# Patient Record
Sex: MALE | Race: WHITE | Employment: OTHER | URBAN - METROPOLITAN AREA
[De-identification: names, ages, dates, MRNs, and addresses within clinical notes are randomized per-mention and may not be internally consistent; named-entity substitution may affect disease eponyms.]

---

## 2023-05-11 ENCOUNTER — APPOINTMENT (OUTPATIENT)
Dept: GENERAL RADIOLOGY | Age: 73
DRG: 477 | End: 2023-05-11
Payer: MEDICARE

## 2023-05-11 ENCOUNTER — HOSPITAL ENCOUNTER (INPATIENT)
Age: 73
LOS: 6 days | Discharge: ANOTHER HOSPITAL | DRG: 477 | End: 2023-05-17
Attending: EMERGENCY MEDICINE | Admitting: STUDENT IN AN ORGANIZED HEALTH CARE EDUCATION/TRAINING PROGRAM
Payer: MEDICARE

## 2023-05-11 DIAGNOSIS — I48.91 ATRIAL FIBRILLATION WITH RVR (HCC): Primary | ICD-10-CM

## 2023-05-11 PROBLEM — F41.9 ANXIETY: Status: ACTIVE | Noted: 2017-10-12

## 2023-05-11 PROBLEM — I10 ESSENTIAL HYPERTENSION: Status: ACTIVE | Noted: 2021-01-27

## 2023-05-11 PROBLEM — F41.9 ANXIETY: Chronic | Status: ACTIVE | Noted: 2017-10-12

## 2023-05-11 PROBLEM — J44.9 CHRONIC OBSTRUCTIVE PULMONARY DISEASE (HCC): Status: ACTIVE | Noted: 2022-03-08

## 2023-05-11 PROBLEM — E78.5 HYPERLIPIDEMIA: Status: ACTIVE | Noted: 2017-10-12

## 2023-05-11 PROBLEM — R00.2 PALPITATIONS: Status: ACTIVE | Noted: 2020-11-10

## 2023-05-11 PROBLEM — J44.9 CHRONIC OBSTRUCTIVE PULMONARY DISEASE (HCC): Chronic | Status: ACTIVE | Noted: 2022-03-08

## 2023-05-11 PROBLEM — E78.5 HYPERLIPIDEMIA: Chronic | Status: ACTIVE | Noted: 2017-10-12

## 2023-05-11 PROBLEM — I42.9 CARDIOMYOPATHY (HCC): Status: ACTIVE | Noted: 2020-12-07

## 2023-05-11 PROBLEM — I25.10 CORONARY ARTERY DISEASE INVOLVING NATIVE CORONARY ARTERY OF NATIVE HEART WITHOUT ANGINA PECTORIS: Chronic | Status: ACTIVE | Noted: 2021-03-05

## 2023-05-11 PROBLEM — I42.9 CARDIOMYOPATHY (HCC): Chronic | Status: ACTIVE | Noted: 2020-12-07

## 2023-05-11 PROBLEM — I25.10 CORONARY ARTERY DISEASE INVOLVING NATIVE CORONARY ARTERY OF NATIVE HEART WITHOUT ANGINA PECTORIS: Status: ACTIVE | Noted: 2021-03-05

## 2023-05-11 LAB
ABSOLUTE EOS #: 0.3 K/UL (ref 0–0.44)
ABSOLUTE IMMATURE GRANULOCYTE: 0.09 K/UL (ref 0–0.3)
ABSOLUTE LYMPH #: 4.16 K/UL (ref 1.1–3.7)
ABSOLUTE MONO #: 1.11 K/UL (ref 0.1–1.2)
ANION GAP SERPL CALCULATED.3IONS-SCNC: 17 MMOL/L (ref 9–17)
BASOPHILS # BLD: 0 % (ref 0–2)
BASOPHILS ABSOLUTE: 0.04 K/UL (ref 0–0.2)
BUN SERPL-MCNC: 21 MG/DL (ref 8–23)
BUN/CREAT BLD: 28 (ref 9–20)
CALCIUM SERPL-MCNC: 9.5 MG/DL (ref 8.6–10.4)
CHLORIDE SERPL-SCNC: 101 MMOL/L (ref 98–107)
CO2 SERPL-SCNC: 22 MMOL/L (ref 20–31)
CREAT SERPL-MCNC: 0.76 MG/DL (ref 0.7–1.2)
EOSINOPHILS RELATIVE PERCENT: 2 % (ref 1–4)
EST. AVERAGE GLUCOSE BLD GHB EST-MCNC: 131 MG/DL
GFR SERPL CREATININE-BSD FRML MDRD: >60 ML/MIN/1.73M2
GLUCOSE BLD-MCNC: 115 MG/DL (ref 75–110)
GLUCOSE BLD-MCNC: 124 MG/DL (ref 75–110)
GLUCOSE BLD-MCNC: 146 MG/DL (ref 75–110)
GLUCOSE SERPL-MCNC: 201 MG/DL (ref 70–99)
HBA1C MFR BLD: 6.2 % (ref 4–6)
HCT VFR BLD AUTO: 41.5 % (ref 40.7–50.3)
HGB BLD-MCNC: 14 G/DL (ref 13–17)
IMMATURE GRANULOCYTES: 1 %
LYMPHOCYTES # BLD: 26 % (ref 24–43)
MAGNESIUM SERPL-MCNC: 1.9 MG/DL (ref 1.6–2.6)
MCH RBC QN AUTO: 32.3 PG (ref 25.2–33.5)
MCHC RBC AUTO-ENTMCNC: 33.7 G/DL (ref 28.4–34.8)
MCV RBC AUTO: 95.6 FL (ref 82.6–102.9)
MONOCYTES # BLD: 7 % (ref 3–12)
MYOGLOBIN SERPL-MCNC: 322 NG/ML (ref 28–72)
NRBC AUTOMATED: 0 PER 100 WBC
PDW BLD-RTO: 13.9 % (ref 11.8–14.4)
PLATELET # BLD AUTO: 176 K/UL (ref 138–453)
PMV BLD AUTO: 9.9 FL (ref 8.1–13.5)
POTASSIUM SERPL-SCNC: 4.6 MMOL/L (ref 3.7–5.3)
RBC # BLD: 4.34 M/UL (ref 4.21–5.77)
SEG NEUTROPHILS: 64 % (ref 36–65)
SEGMENTED NEUTROPHILS ABSOLUTE COUNT: 10.26 K/UL (ref 1.5–8.1)
SODIUM SERPL-SCNC: 140 MMOL/L (ref 135–144)
TROPONIN I SERPL DL<=0.01 NG/ML-MCNC: 28 NG/L (ref 0–22)
WBC # BLD AUTO: 16 K/UL (ref 3.5–11.3)

## 2023-05-11 PROCEDURE — 85025 COMPLETE CBC W/AUTO DIFF WBC: CPT

## 2023-05-11 PROCEDURE — 99223 1ST HOSP IP/OBS HIGH 75: CPT | Performed by: NURSE PRACTITIONER

## 2023-05-11 PROCEDURE — 6370000000 HC RX 637 (ALT 250 FOR IP): Performed by: NURSE PRACTITIONER

## 2023-05-11 PROCEDURE — 96375 TX/PRO/DX INJ NEW DRUG ADDON: CPT

## 2023-05-11 PROCEDURE — 80048 BASIC METABOLIC PNL TOTAL CA: CPT

## 2023-05-11 PROCEDURE — 83735 ASSAY OF MAGNESIUM: CPT

## 2023-05-11 PROCEDURE — 84484 ASSAY OF TROPONIN QUANT: CPT

## 2023-05-11 PROCEDURE — 2500000003 HC RX 250 WO HCPCS: Performed by: EMERGENCY MEDICINE

## 2023-05-11 PROCEDURE — 82947 ASSAY GLUCOSE BLOOD QUANT: CPT

## 2023-05-11 PROCEDURE — 6360000002 HC RX W HCPCS: Performed by: EMERGENCY MEDICINE

## 2023-05-11 PROCEDURE — 96376 TX/PRO/DX INJ SAME DRUG ADON: CPT

## 2023-05-11 PROCEDURE — 73502 X-RAY EXAM HIP UNI 2-3 VIEWS: CPT

## 2023-05-11 PROCEDURE — 2000000000 HC ICU R&B

## 2023-05-11 PROCEDURE — 83874 ASSAY OF MYOGLOBIN: CPT

## 2023-05-11 PROCEDURE — 93005 ELECTROCARDIOGRAM TRACING: CPT | Performed by: EMERGENCY MEDICINE

## 2023-05-11 PROCEDURE — 83036 HEMOGLOBIN GLYCOSYLATED A1C: CPT

## 2023-05-11 PROCEDURE — 2580000003 HC RX 258: Performed by: EMERGENCY MEDICINE

## 2023-05-11 PROCEDURE — 96361 HYDRATE IV INFUSION ADD-ON: CPT

## 2023-05-11 PROCEDURE — 99285 EMERGENCY DEPT VISIT HI MDM: CPT

## 2023-05-11 PROCEDURE — 96374 THER/PROPH/DIAG INJ IV PUSH: CPT

## 2023-05-11 PROCEDURE — 6360000002 HC RX W HCPCS: Performed by: NURSE PRACTITIONER

## 2023-05-11 PROCEDURE — 71045 X-RAY EXAM CHEST 1 VIEW: CPT

## 2023-05-11 RX ORDER — ACETAMINOPHEN 650 MG/1
650 SUPPOSITORY RECTAL EVERY 6 HOURS PRN
Status: DISCONTINUED | OUTPATIENT
Start: 2023-05-11 | End: 2023-05-17 | Stop reason: HOSPADM

## 2023-05-11 RX ORDER — ATORVASTATIN CALCIUM 40 MG/1
40 TABLET, FILM COATED ORAL NIGHTLY
Status: DISCONTINUED | OUTPATIENT
Start: 2023-05-11 | End: 2023-05-17 | Stop reason: HOSPADM

## 2023-05-11 RX ORDER — SODIUM CHLORIDE 0.9 % (FLUSH) 0.9 %
5-40 SYRINGE (ML) INJECTION EVERY 12 HOURS SCHEDULED
Status: DISCONTINUED | OUTPATIENT
Start: 2023-05-11 | End: 2023-05-17 | Stop reason: HOSPADM

## 2023-05-11 RX ORDER — SODIUM CHLORIDE 0.9 % (FLUSH) 0.9 %
10 SYRINGE (ML) INJECTION PRN
Status: DISCONTINUED | OUTPATIENT
Start: 2023-05-11 | End: 2023-05-17 | Stop reason: HOSPADM

## 2023-05-11 RX ORDER — ATORVASTATIN CALCIUM 40 MG/1
TABLET, FILM COATED ORAL
Status: ON HOLD | COMMUNITY
Start: 2023-03-22 | End: 2023-05-19 | Stop reason: HOSPADM

## 2023-05-11 RX ORDER — DEXTROSE MONOHYDRATE 100 MG/ML
INJECTION, SOLUTION INTRAVENOUS CONTINUOUS PRN
Status: DISCONTINUED | OUTPATIENT
Start: 2023-05-11 | End: 2023-05-17 | Stop reason: HOSPADM

## 2023-05-11 RX ORDER — HYDROCODONE BITARTRATE AND ACETAMINOPHEN 5; 325 MG/1; MG/1
1 TABLET ORAL EVERY 6 HOURS PRN
Status: DISCONTINUED | OUTPATIENT
Start: 2023-05-11 | End: 2023-05-13

## 2023-05-11 RX ORDER — DILTIAZEM HYDROCHLORIDE 5 MG/ML
20 INJECTION INTRAVENOUS ONCE
Status: COMPLETED | OUTPATIENT
Start: 2023-05-11 | End: 2023-05-11

## 2023-05-11 RX ORDER — SPIRONOLACTONE 25 MG/1
25 TABLET ORAL DAILY
Status: DISCONTINUED | OUTPATIENT
Start: 2023-05-11 | End: 2023-05-12

## 2023-05-11 RX ORDER — INSULIN LISPRO 100 [IU]/ML
0-4 INJECTION, SOLUTION INTRAVENOUS; SUBCUTANEOUS NIGHTLY
Status: DISCONTINUED | OUTPATIENT
Start: 2023-05-11 | End: 2023-05-15

## 2023-05-11 RX ORDER — SODIUM CHLORIDE 9 MG/ML
INJECTION, SOLUTION INTRAVENOUS PRN
Status: DISCONTINUED | OUTPATIENT
Start: 2023-05-11 | End: 2023-05-17 | Stop reason: HOSPADM

## 2023-05-11 RX ORDER — CLOPIDOGREL BISULFATE 75 MG/1
75 TABLET ORAL DAILY
Status: ON HOLD | COMMUNITY
Start: 2023-03-29 | End: 2023-05-19 | Stop reason: HOSPADM

## 2023-05-11 RX ORDER — ONDANSETRON 2 MG/ML
4 INJECTION INTRAMUSCULAR; INTRAVENOUS EVERY 6 HOURS PRN
Status: DISCONTINUED | OUTPATIENT
Start: 2023-05-11 | End: 2023-05-13

## 2023-05-11 RX ORDER — ONDANSETRON 4 MG/1
4 TABLET, ORALLY DISINTEGRATING ORAL EVERY 8 HOURS PRN
Status: DISCONTINUED | OUTPATIENT
Start: 2023-05-11 | End: 2023-05-17 | Stop reason: HOSPADM

## 2023-05-11 RX ORDER — INSULIN LISPRO 100 [IU]/ML
0-4 INJECTION, SOLUTION INTRAVENOUS; SUBCUTANEOUS
Status: DISCONTINUED | OUTPATIENT
Start: 2023-05-11 | End: 2023-05-15

## 2023-05-11 RX ORDER — CEPHALEXIN 500 MG/1
500 CAPSULE ORAL EVERY 6 HOURS SCHEDULED
Status: COMPLETED | OUTPATIENT
Start: 2023-05-11 | End: 2023-05-13

## 2023-05-11 RX ORDER — CLOPIDOGREL BISULFATE 75 MG/1
75 TABLET ORAL DAILY
Status: DISCONTINUED | OUTPATIENT
Start: 2023-05-11 | End: 2023-05-17 | Stop reason: HOSPADM

## 2023-05-11 RX ORDER — MORPHINE SULFATE 2 MG/ML
2 INJECTION, SOLUTION INTRAMUSCULAR; INTRAVENOUS ONCE
Status: COMPLETED | OUTPATIENT
Start: 2023-05-11 | End: 2023-05-11

## 2023-05-11 RX ORDER — METOPROLOL SUCCINATE 100 MG/1
TABLET, EXTENDED RELEASE ORAL
Status: ON HOLD | COMMUNITY
Start: 2023-01-12 | End: 2023-05-19 | Stop reason: HOSPADM

## 2023-05-11 RX ORDER — POTASSIUM CHLORIDE 7.45 MG/ML
10 INJECTION INTRAVENOUS PRN
Status: DISCONTINUED | OUTPATIENT
Start: 2023-05-11 | End: 2023-05-17 | Stop reason: HOSPADM

## 2023-05-11 RX ORDER — M-VIT,TX,IRON,MINS/CALC/FOLIC 27MG-0.4MG
1 TABLET ORAL DAILY
Status: ON HOLD | COMMUNITY
End: 2023-05-19 | Stop reason: HOSPADM

## 2023-05-11 RX ORDER — SPIRONOLACTONE 25 MG/1
12.5 TABLET ORAL DAILY
Status: ON HOLD | COMMUNITY
Start: 2023-03-29 | End: 2023-05-19 | Stop reason: HOSPADM

## 2023-05-11 RX ORDER — HYDROCODONE BITARTRATE AND ACETAMINOPHEN 5; 325 MG/1; MG/1
2 TABLET ORAL EVERY 4 HOURS PRN
Status: DISCONTINUED | OUTPATIENT
Start: 2023-05-11 | End: 2023-05-13

## 2023-05-11 RX ORDER — DIGOXIN 0.25 MG/ML
250 INJECTION INTRAMUSCULAR; INTRAVENOUS ONCE
Status: COMPLETED | OUTPATIENT
Start: 2023-05-11 | End: 2023-05-11

## 2023-05-11 RX ORDER — MAGNESIUM SULFATE 1 G/100ML
1000 INJECTION INTRAVENOUS PRN
Status: DISCONTINUED | OUTPATIENT
Start: 2023-05-11 | End: 2023-05-17 | Stop reason: HOSPADM

## 2023-05-11 RX ORDER — METOPROLOL SUCCINATE 50 MG/1
100 TABLET, EXTENDED RELEASE ORAL DAILY
Status: DISCONTINUED | OUTPATIENT
Start: 2023-05-11 | End: 2023-05-12

## 2023-05-11 RX ORDER — LEVALBUTEROL TARTRATE 45 UG/1
2 AEROSOL, METERED ORAL EVERY 6 HOURS PRN
Status: DISCONTINUED | OUTPATIENT
Start: 2023-05-11 | End: 2023-05-17 | Stop reason: HOSPADM

## 2023-05-11 RX ORDER — 0.9 % SODIUM CHLORIDE 0.9 %
500 INTRAVENOUS SOLUTION INTRAVENOUS ONCE
Status: COMPLETED | OUTPATIENT
Start: 2023-05-11 | End: 2023-05-11

## 2023-05-11 RX ORDER — POLYETHYLENE GLYCOL 3350 17 G/17G
17 POWDER, FOR SOLUTION ORAL DAILY PRN
Status: DISCONTINUED | OUTPATIENT
Start: 2023-05-11 | End: 2023-05-17 | Stop reason: HOSPADM

## 2023-05-11 RX ORDER — POTASSIUM CHLORIDE 20 MEQ/1
40 TABLET, EXTENDED RELEASE ORAL PRN
Status: DISCONTINUED | OUTPATIENT
Start: 2023-05-11 | End: 2023-05-17 | Stop reason: HOSPADM

## 2023-05-11 RX ORDER — DILTIAZEM HYDROCHLORIDE 5 MG/ML
10 INJECTION INTRAVENOUS ONCE
Status: COMPLETED | OUTPATIENT
Start: 2023-05-11 | End: 2023-05-11

## 2023-05-11 RX ORDER — MORPHINE SULFATE 4 MG/ML
4 INJECTION, SOLUTION INTRAMUSCULAR; INTRAVENOUS ONCE
Status: COMPLETED | OUTPATIENT
Start: 2023-05-11 | End: 2023-05-11

## 2023-05-11 RX ORDER — ACETAMINOPHEN 325 MG/1
650 TABLET ORAL EVERY 6 HOURS PRN
Status: DISCONTINUED | OUTPATIENT
Start: 2023-05-11 | End: 2023-05-17 | Stop reason: HOSPADM

## 2023-05-11 RX ORDER — LISINOPRIL 5 MG/1
TABLET ORAL
Status: ON HOLD | COMMUNITY
End: 2023-05-19 | Stop reason: HOSPADM

## 2023-05-11 RX ORDER — MORPHINE SULFATE 4 MG/ML
4 INJECTION, SOLUTION INTRAMUSCULAR; INTRAVENOUS ONCE
Status: DISCONTINUED | OUTPATIENT
Start: 2023-05-11 | End: 2023-05-11

## 2023-05-11 RX ORDER — SODIUM CHLORIDE 9 MG/ML
INJECTION, SOLUTION INTRAVENOUS CONTINUOUS
Status: DISCONTINUED | OUTPATIENT
Start: 2023-05-11 | End: 2023-05-12

## 2023-05-11 RX ADMIN — CLOPIDOGREL BISULFATE 75 MG: 75 TABLET ORAL at 16:06

## 2023-05-11 RX ADMIN — SODIUM CHLORIDE 5 MG/HR: 900 INJECTION, SOLUTION INTRAVENOUS at 13:22

## 2023-05-11 RX ADMIN — DILTIAZEM HYDROCHLORIDE 20 MG: 5 INJECTION INTRAVENOUS at 11:04

## 2023-05-11 RX ADMIN — APIXABAN 5 MG: 5 TABLET, FILM COATED ORAL at 16:06

## 2023-05-11 RX ADMIN — APIXABAN 5 MG: 5 TABLET, FILM COATED ORAL at 20:48

## 2023-05-11 RX ADMIN — MORPHINE SULFATE 4 MG: 4 INJECTION, SOLUTION INTRAMUSCULAR; INTRAVENOUS at 11:04

## 2023-05-11 RX ADMIN — METOPROLOL SUCCINATE 100 MG: 50 TABLET, EXTENDED RELEASE ORAL at 17:48

## 2023-05-11 RX ADMIN — MORPHINE SULFATE 2 MG: 2 INJECTION, SOLUTION INTRAMUSCULAR; INTRAVENOUS at 13:19

## 2023-05-11 RX ADMIN — SPIRONOLACTONE 25 MG: 25 TABLET ORAL at 17:48

## 2023-05-11 RX ADMIN — DIGOXIN 250 MCG: 0.25 INJECTION INTRAMUSCULAR; INTRAVENOUS at 15:07

## 2023-05-11 RX ADMIN — HYDROCODONE BITARTRATE AND ACETAMINOPHEN 2 TABLET: 5; 325 TABLET ORAL at 16:06

## 2023-05-11 RX ADMIN — ATORVASTATIN CALCIUM 40 MG: 40 TABLET, FILM COATED ORAL at 20:48

## 2023-05-11 RX ADMIN — SODIUM CHLORIDE 500 ML: 9 INJECTION, SOLUTION INTRAVENOUS at 12:16

## 2023-05-11 RX ADMIN — CEPHALEXIN 500 MG: 500 CAPSULE ORAL at 20:48

## 2023-05-11 RX ADMIN — DILTIAZEM HYDROCHLORIDE 10 MG: 5 INJECTION INTRAVENOUS at 12:14

## 2023-05-11 RX ADMIN — CEPHALEXIN 500 MG: 500 CAPSULE ORAL at 16:07

## 2023-05-11 ASSESSMENT — PAIN DESCRIPTION - ORIENTATION: ORIENTATION: LEFT

## 2023-05-11 ASSESSMENT — ENCOUNTER SYMPTOMS
COLOR CHANGE: 0
VOMITING: 0
COUGH: 0
CONSTIPATION: 0
EYE DISCHARGE: 0
DIARRHEA: 0
FACIAL SWELLING: 0
SHORTNESS OF BREATH: 0
ABDOMINAL PAIN: 0
EYE REDNESS: 0

## 2023-05-11 ASSESSMENT — PAIN SCALES - GENERAL
PAINLEVEL_OUTOF10: 2
PAINLEVEL_OUTOF10: 10
PAINLEVEL_OUTOF10: 10

## 2023-05-11 ASSESSMENT — PAIN DESCRIPTION - LOCATION: LOCATION: HIP

## 2023-05-11 NOTE — CARE COORDINATION
Case Management Assessment  Initial Evaluation    Date/Time of Evaluation: 5/11/2023 2:33 PM  Assessment Completed by: Loel Spurling, LSW    If patient is discharged prior to next notation, then this note serves as note for discharge by case management. Patient Name: Paige Mendez                   YOB: 1950  Diagnosis: Atrial fibrillation with RVR ([address]) [I48.91]                   Date / Time: 5/11/2023 10:32 AM    Patient Admission Status: Inpatient   Readmission Risk (Low < 19, Mod (19-27), High > 27): No data recorded  Current PCP: Cherisse Collet, MD  PCP verified by CM? Yes    Chart Reviewed: Yes      History Provided by: Patient  Patient Orientation: Alert and Oriented    Patient Cognition: Alert    Hospitalization in the last 30 days (Readmission):  No    If yes, Readmission Assessment in  Navigator will be completed. Advance Directives:      Code Status: Full Code   Patient's Primary Decision Maker is:        Discharge Planning:    Patient lives with: Spouse/Significant Other Type of Home: House  Primary Care Giver: Self  Patient Support Systems include: Spouse/Significant Other, Children   Current Financial resources: Medicare  Current community resources:    Current services prior to admission: Durable Medical Equipment            Current DME: Anjel Form            Type of Home Care services:  None    ADLS  Prior functional level: Independent in ADLs/IADLs  Current functional level: Independent in ADLs/IADLs    PT AM-PAC:   /24  OT AM-PAC:   /24    Family can provide assistance at DC: Yes  Would you like Case Management to discuss the discharge plan with any other family members/significant others, and if so, who?  No  Plans to Return to Present Housing: Yes  Other Identified Issues/Barriers to RETURNING to current housing: none  Potential Assistance needed at discharge: N/A            Potential DME:    Patient expects to discharge to: [address] [place] [address] for transportation at discharge:

## 2023-05-11 NOTE — ACP (ADVANCE CARE PLANNING)
Advance Care Planning     Advance Care Planning Activator (Inpatient)  Conversation Note      Date of ACP Conversation: 5/11/2023     Conversation Conducted with: Patient with Decision Making Capacity    ACP Activator: Migdalia Claudio, [facility]:     Current Designated Health Care Decision Maker:     Click here to complete [address] including section of the Healthcare Decision Maker Relationship (ie \"Primary\")  Today we documented Decision Maker(s) consistent with Legal Next of Kin hierarchy. Care Preferences    Ventilation: \"If you were in your present state of health and suddenly became very ill and were unable to breathe on your own, what would your preference be about the use of a ventilator (breathing machine) if it were available to you? \"      Would the patient desire the use of ventilator (breathing machine)?: no    \"If your health worsens and it becomes clear that your chance of recovery is unlikely, what would your preference be about the use of a ventilator (breathing machine) if it were available to you? \"     Would the patient desire the use of ventilator (breathing machine)?: No      Resuscitation  \"CPR works best to restart the heart when there is a sudden event, like a heart attack, in someone who is otherwise healthy. Unfortunately, CPR does not typically restart the heart for people who have serious health conditions or who are very sick. \"    \"In the event your heart stopped as a result of an underlying serious health condition, would you want attempts to be made to restart your heart (answer \"yes\" for attempt to resuscitate) or would you prefer a natural death (answer \"no\" for do not attempt to resuscitate)? \" no       [] Yes   [x] No   Educated Patient / Uriel Uribe regarding differences between Advance Directives and portable DNR orders.     Length of ACP Conversation in minutes:      Conversation Outcomes:  ACP discussion completed    Follow-up

## 2023-05-11 NOTE — ED PROVIDER NOTES
[facility] ED  EMERGENCY DEPARTMENT ENCOUNTER      Pt Name: Rere Gusman  MRN: [id number]  [address] [place] 1950  Date of evaluation: 5/11/2023  Provider: Catrachito Medrano MD    CHIEF COMPLAINT       Chief Complaint   Patient presents with    Hip Pain     Pt states he was at [facility] clinic to get a pain injection for his back when he fell, no LOC, and has pain in Lt hip 10/10, Pt on Blood thinners. Tachycardia         HISTORY OF PRESENT ILLNESS  (Location/Symptom, Timing/Onset, Context/Setting, Quality, Duration, Modifying Factors, Severity.)   Rere Gusman is a 74 y.o. male who presents to the emergency department for complaints of pain in his left hip. This is an ongoing issue and he was supposed to see his pain management doctor today. He did not make it to the appointment. His heart rate was fast and he \"went down. \"  He did not lose consciousness and did not injure his hip. He has a history of atrial fibrillation and is on a blood thinner. The hip pain is severe. He does not have palpitations. Nursing Notes were reviewed. ALLERGIES     Patient has no known allergies. CURRENT MEDICATIONS       Previous Medications    No medications on file       PAST MEDICAL HISTORY   No past medical history on file. SURGICAL HISTORY     No past surgical history on file. FAMILY HISTORY     No family history on file. No family status information on file. SOCIAL HISTORY          REVIEW OF SYSTEMS    (2-9 systems for level 4, 10 or more for level 5)     Review of Systems   Constitutional:  Negative for chills, fatigue and fever. HENT:  Negative for congestion, ear discharge and facial swelling. Eyes:  Negative for discharge and redness. Respiratory:  Negative for cough and shortness of breath. Cardiovascular:  Negative for chest pain. Gastrointestinal:  Negative for abdominal pain, constipation, diarrhea and vomiting. Genitourinary:  Negative for dysuria and hematuria.

## 2023-05-11 NOTE — PROGRESS NOTES
Pt received from ER per cart. Pt CO L hip pain 10/10, repositioned to assist in pain relief. Placed on continuous cardiac monitor and pulse ox. Oriented to room, instructed to remain in bed and use call button to ask for help if needed. Wife present.

## 2023-05-11 NOTE — ED NOTES
ED to inpatient nurses report     Chief Complaint   Patient presents with    Hip Pain     Pt states he was at [facility] clinic to get a pain injection for his back when he fell, no LOC, and has pain in Lt hip 10/10, Pt on Blood thinners. Tachycardia      Present to ED from home who presents to the emergency department for complaints of pain in his left hip. This is an ongoing issue and he was supposed to see his pain management doctor today. He did not make it to the appointment. His heart rate was fast and he \"went down. \"  He did not lose consciousness and did not injure his hip. He has a history of atrial fibrillation and is on a blood thinner. The hip pain is severe. He does not have palpitations.   LOC: alert and orientated to name, place, date  Vital signs   Vitals:    05/11/23 1045 05/11/23 1109 05/11/23 1115 05/11/23 1214   BP: (!) 112/96 111/84 93/66 107/69   Pulse: (!) 168 (!) 159 (!) 139    Resp: 26 21 23    Temp:       TempSrc:       SpO2: 97% 97% 96%    Weight:       Height:          Oxygen Baseline RA    Current needs required RA       LDAs:   Peripheral IV 05/11/23 Right Antecubital (Active)   Site Assessment Clean, dry & intact 05/11/23 1103   Dressing Status New dressing applied;Clean, dry & intact 05/11/23 1103     Mobility: Requires assistance * 1  Fall Risk:    Pending ED orders: None  Code Status: Full  Consults: IP CONSULT TO HOSPITALIST  [x]  Hospitalist  Completed  [x] yes [] no Who:   []  Medicine  Completed  [] yes [] No Who:   []  Cardiology  Completed  [] yes [] No Who:   []  GI   Completed  [] yes [] No Who:   []  Neurology  Completed  [] yes [] No Who:   []  Nephrology Completed  [] yes [] No Who:    []  Vascular  Completed  [] yes [] No Who:   []  Ortho  Completed  [] yes [] No Who:     []  Surgery  Completed  [] yes [] No Who:    []  Urology  Completed  [] yes [] No Who:    []  CT Surgery Completed  [] yes [] No Who:   []  Podiatry  Completed  [] yes [] No Who:    []  Other

## 2023-05-11 NOTE — H&P
[facility]  Office: [id number], DO, Leo Henry, DO, Royce Ortiz, DO, Doris Burks, DO, Elvira Nazario MD, Pam Holly MD, Moiz Norton MD, Nadine Whittington MD,  Casey Hart MD, Curt Perla MD, Josee Patel, DO, Ismael Morris MD,  Hope De Leon MD, Yareli Verde MD, Alee Graham, DO, Gustavus Najjar, MD, Shanice Richey MD, Rell Oneil DO, [place] Bonner MD, Gerald Kanner, MD, Becky Peralta MD, Adalgisa Guzman MD,  Cookie Brittle, DO, Katie Donis MD,  Carlene Mendez, CNP,  Emma Hurtado CNP, Janae Mendoza, CNP, Leisa Casarez, CNP,  Tay Foreman, [facility], Catherine Leavitt, CNP, Bobo Winchester, CNP, Hansel Angelucci, CNP, Katie Goodwin, CNP, Dulce Rothman, CNP, Radha Rodriguez PA-C, Angel Harrison, CNS, Eloy Santos, CNP, Mega Mejia, CNP         [place]    HISTORY AND PHYSICAL EXAMINATION            Date:   5/11/2023  Patient name:  Beto Gonzalez  Date of admission:  5/11/2023 10:32 AM  MRN:   [id number]  Account:  [de-identified]  YOB: 1950  PCP:    Dre Parsons MD  Room:   2027/2027-01  Code Status:    Full Code    Chief Complaint:     Chief Complaint   Patient presents with    Hip Pain     Pt states he was at [facility] clinic to get a pain injection for his back when he fell, no LOC, and has pain in Lt hip 10/10, Pt on Blood thinners. Tachycardia       History Obtained From:     patient, electronic medical record    History of Present Illness:     Beto Gonzalez is a 74 y.o. Non- / non  male who presents with Hip Pain (Pt states he was at [facility] clinic to get a pain injection for his back when he fell, no LOC, and has pain in Lt hip 10/10, Pt on Blood thinners. ) and Tachycardia   and is admitted to the hospital for the management of Atrial fibrillation with RVR ([address]).     The patient states he was heading into management today related to chronic left hip pain when he fell. He denies LOC or feeling lightheaded or dizzy prior to the fall. He states he just fell. He did not hit his head. He states he has chronic left hip pain with numbness and tingling that's worse since the fall and that he scraped up his elbows. He told the ER physician that his heart was fast and he went down. He denies chest pain or shortness of breath. Take his morning meds including Toprol- mg p.o. because he is whenever his injection pain management. Patient reports a history of a cardioversion at some point in the past.     He states he has been taking antibiotics 4 times a day related to UTI. On chart review he did have a urine culture that was positive for E. coli on 5/3 and was started on Keflex 500 mg 4 times a day for 10 days. Per the culture he would be more sensitive to Cipro but he does not currently have any urinary complaints. Chest x-ray today showed no acute cardiopulmonary process. X-rays of the left hip showed no acute osseous or soft tissue abnormality. White count 16.0  Glucose was 201    On arrival to the ER with heart rates in the 160s. During assessment it was ranged between 130 and 150. Patient is currently on Cardizem drip. I ordered a one-time dose of digoxin and asked nursing to reach out to cardiology for further recommendations. Past Medical History:     Past Medical History:   Diagnosis Date    A-fib [facility])     Anxiety 10/12/2017    Cardiomyopathy ([address]) 12/7/2020    Formatting of this note might be different from the original. Mixed ischemic and nonischemic (secondary to atrial fibrillation)  Last Assessment & Plan:  Formatting of this note might be different from the original. No symptoms of heart failure or volume overload.   Will get an echocardiogram in 3 months to ensure his EF has normalized which I would not be surprised as long as he maintains sinus rh    Chronic obstructive pulmonary disease ([address]) 3/8/2022    Coronary

## 2023-05-12 ENCOUNTER — APPOINTMENT (OUTPATIENT)
Dept: MRI IMAGING | Age: 73
DRG: 477 | End: 2023-05-12
Payer: MEDICARE

## 2023-05-12 PROBLEM — R77.8 ELEVATED TROPONIN LEVEL NOT DUE MYOCARDIAL INFARCTION: Status: ACTIVE | Noted: 2023-05-12

## 2023-05-12 PROBLEM — E43 SEVERE MALNUTRITION (HCC): Status: ACTIVE | Noted: 2023-05-12

## 2023-05-12 LAB
ABSOLUTE EOS #: 0.14 K/UL (ref 0–0.44)
ABSOLUTE IMMATURE GRANULOCYTE: 0.05 K/UL (ref 0–0.3)
ABSOLUTE LYMPH #: 2.08 K/UL (ref 1.1–3.7)
ABSOLUTE MONO #: 0.94 K/UL (ref 0.1–1.2)
ABSOLUTE RETIC #: 0.04 M/UL (ref 0.03–0.08)
ANION GAP SERPL CALCULATED.3IONS-SCNC: 10 MMOL/L (ref 9–17)
BASOPHILS # BLD: 0 % (ref 0–2)
BASOPHILS ABSOLUTE: <0.03 K/UL (ref 0–0.2)
BUN SERPL-MCNC: 17 MG/DL (ref 8–23)
BUN/CREAT BLD: 40 (ref 9–20)
CALCIUM SERPL-MCNC: 8.2 MG/DL (ref 8.6–10.4)
CHLORIDE SERPL-SCNC: 108 MMOL/L (ref 98–107)
CO2 SERPL-SCNC: 22 MMOL/L (ref 20–31)
CREAT SERPL-MCNC: 0.43 MG/DL (ref 0.7–1.2)
EKG ATRIAL RATE: 138 BPM
EKG Q-T INTERVAL: 320 MS
EKG QRS DURATION: 68 MS
EKG QTC CALCULATION (BAZETT): 507 MS
EKG R AXIS: 13 DEGREES
EKG T AXIS: 80 DEGREES
EKG VENTRICULAR RATE: 151 BPM
EOSINOPHILS RELATIVE PERCENT: 1 % (ref 1–4)
EST. AVERAGE GLUCOSE BLD GHB EST-MCNC: 128 MG/DL
FIBRINOGEN: 372 MG/DL (ref 179–518)
GFR SERPL CREATININE-BSD FRML MDRD: >60 ML/MIN/1.73M2
GLUCOSE BLD-MCNC: 118 MG/DL (ref 75–110)
GLUCOSE BLD-MCNC: 129 MG/DL (ref 75–110)
GLUCOSE BLD-MCNC: 204 MG/DL (ref 75–110)
GLUCOSE BLD-MCNC: 269 MG/DL (ref 75–110)
GLUCOSE SERPL-MCNC: 122 MG/DL (ref 70–99)
HAPTOGLOB SERPL-MCNC: 328 MG/DL (ref 30–200)
HBA1C MFR BLD: 6.1 % (ref 4–6)
HCT VFR BLD AUTO: 34.9 % (ref 40.7–50.3)
HCT VFR BLD AUTO: 35.8 % (ref 40.7–50.3)
HGB BLD-MCNC: 11.7 G/DL (ref 13–17)
HGB BLD-MCNC: 12.3 G/DL (ref 13–17)
IMMATURE GRANULOCYTES: 0 %
IMMATURE RETIC FRACT: 7.3 % (ref 2.7–18.3)
INR PPP: 1.6
LDLC SERPL-MCNC: 360 U/L (ref 135–225)
LYMPHOCYTES # BLD: 17 % (ref 24–43)
MCH RBC QN AUTO: 32.6 PG (ref 25.2–33.5)
MCH RBC QN AUTO: 32.9 PG (ref 25.2–33.5)
MCHC RBC AUTO-ENTMCNC: 33.5 G/DL (ref 28.4–34.8)
MCHC RBC AUTO-ENTMCNC: 34.4 G/DL (ref 28.4–34.8)
MCV RBC AUTO: 95.7 FL (ref 82.6–102.9)
MCV RBC AUTO: 97.2 FL (ref 82.6–102.9)
MONOCYTES # BLD: 8 % (ref 3–12)
NRBC AUTOMATED: 0 PER 100 WBC
NRBC AUTOMATED: 0 PER 100 WBC
PDW BLD-RTO: 13.9 % (ref 11.8–14.4)
PDW BLD-RTO: 14 % (ref 11.8–14.4)
PLATELET # BLD AUTO: 128 K/UL (ref 138–453)
PLATELET # BLD AUTO: 147 K/UL (ref 138–453)
PMV BLD AUTO: 10 FL (ref 8.1–13.5)
PMV BLD AUTO: 9.7 FL (ref 8.1–13.5)
POTASSIUM SERPL-SCNC: 3.7 MMOL/L (ref 3.7–5.3)
PROTHROMBIN TIME: 18.7 SEC (ref 11.5–14.2)
RBC # BLD: 3.59 M/UL (ref 4.21–5.77)
RBC # BLD: 3.74 M/UL (ref 4.21–5.77)
RETIC HEMOGLOBIN: 35.3 PG (ref 28.2–35.7)
RETICS/RBC NFR AUTO: 1 % (ref 0.5–1.9)
SEG NEUTROPHILS: 74 % (ref 36–65)
SEGMENTED NEUTROPHILS ABSOLUTE COUNT: 9.3 K/UL (ref 1.5–8.1)
SODIUM SERPL-SCNC: 140 MMOL/L (ref 135–144)
TSH SERPL-ACNC: 1.03 UIU/ML (ref 0.3–5)
WBC # BLD AUTO: 12.5 K/UL (ref 3.5–11.3)
WBC # BLD AUTO: 12.7 K/UL (ref 3.5–11.3)

## 2023-05-12 PROCEDURE — 97530 THERAPEUTIC ACTIVITIES: CPT

## 2023-05-12 PROCEDURE — 83615 LACTATE (LD) (LDH) ENZYME: CPT

## 2023-05-12 PROCEDURE — 85384 FIBRINOGEN ACTIVITY: CPT

## 2023-05-12 PROCEDURE — 85025 COMPLETE CBC W/AUTO DIFF WBC: CPT

## 2023-05-12 PROCEDURE — 93010 ELECTROCARDIOGRAM REPORT: CPT | Performed by: INTERNAL MEDICINE

## 2023-05-12 PROCEDURE — 6370000000 HC RX 637 (ALT 250 FOR IP): Performed by: INTERNAL MEDICINE

## 2023-05-12 PROCEDURE — 83010 ASSAY OF HAPTOGLOBIN QUANT: CPT

## 2023-05-12 PROCEDURE — 97167 OT EVAL HIGH COMPLEX 60 MIN: CPT

## 2023-05-12 PROCEDURE — 2500000003 HC RX 250 WO HCPCS: Performed by: NURSE PRACTITIONER

## 2023-05-12 PROCEDURE — 36415 COLL VENOUS BLD VENIPUNCTURE: CPT

## 2023-05-12 PROCEDURE — 94761 N-INVAS EAR/PLS OXIMETRY MLT: CPT

## 2023-05-12 PROCEDURE — 85027 COMPLETE CBC AUTOMATED: CPT

## 2023-05-12 PROCEDURE — 80048 BASIC METABOLIC PNL TOTAL CA: CPT

## 2023-05-12 PROCEDURE — 6370000000 HC RX 637 (ALT 250 FOR IP): Performed by: NURSE PRACTITIONER

## 2023-05-12 PROCEDURE — 83036 HEMOGLOBIN GLYCOSYLATED A1C: CPT

## 2023-05-12 PROCEDURE — 97535 SELF CARE MNGMENT TRAINING: CPT

## 2023-05-12 PROCEDURE — 85045 AUTOMATED RETICULOCYTE COUNT: CPT

## 2023-05-12 PROCEDURE — 97163 PT EVAL HIGH COMPLEX 45 MIN: CPT

## 2023-05-12 PROCEDURE — 82947 ASSAY GLUCOSE BLOOD QUANT: CPT

## 2023-05-12 PROCEDURE — 97116 GAIT TRAINING THERAPY: CPT

## 2023-05-12 PROCEDURE — 99232 SBSQ HOSP IP/OBS MODERATE 35: CPT | Performed by: INTERNAL MEDICINE

## 2023-05-12 PROCEDURE — 2580000003 HC RX 258: Performed by: NURSE PRACTITIONER

## 2023-05-12 PROCEDURE — 85610 PROTHROMBIN TIME: CPT

## 2023-05-12 PROCEDURE — 72148 MRI LUMBAR SPINE W/O DYE: CPT

## 2023-05-12 PROCEDURE — 84443 ASSAY THYROID STIM HORMONE: CPT

## 2023-05-12 PROCEDURE — 2060000000 HC ICU INTERMEDIATE R&B

## 2023-05-12 RX ORDER — DILTIAZEM HYDROCHLORIDE 120 MG/1
120 CAPSULE, COATED, EXTENDED RELEASE ORAL DAILY
Status: DISCONTINUED | OUTPATIENT
Start: 2023-05-12 | End: 2023-05-17 | Stop reason: HOSPADM

## 2023-05-12 RX ORDER — METOPROLOL SUCCINATE 50 MG/1
150 TABLET, EXTENDED RELEASE ORAL DAILY
Status: DISCONTINUED | OUTPATIENT
Start: 2023-05-13 | End: 2023-05-14

## 2023-05-12 RX ADMIN — CEPHALEXIN 500 MG: 500 CAPSULE ORAL at 23:45

## 2023-05-12 RX ADMIN — SODIUM CHLORIDE: 9 INJECTION, SOLUTION INTRAVENOUS at 09:23

## 2023-05-12 RX ADMIN — CLOPIDOGREL BISULFATE 75 MG: 75 TABLET ORAL at 08:32

## 2023-05-12 RX ADMIN — SPIRONOLACTONE 25 MG: 25 TABLET ORAL at 08:42

## 2023-05-12 RX ADMIN — HYDROCODONE BITARTRATE AND ACETAMINOPHEN 1 TABLET: 5; 325 TABLET ORAL at 21:43

## 2023-05-12 RX ADMIN — DILTIAZEM HYDROCHLORIDE 120 MG: 120 CAPSULE, COATED, EXTENDED RELEASE ORAL at 18:53

## 2023-05-12 RX ADMIN — INSULIN LISPRO 2 UNITS: 100 INJECTION, SOLUTION INTRAVENOUS; SUBCUTANEOUS at 18:40

## 2023-05-12 RX ADMIN — CEPHALEXIN 500 MG: 500 CAPSULE ORAL at 00:23

## 2023-05-12 RX ADMIN — SODIUM CHLORIDE 7.5 MG/HR: 900 INJECTION, SOLUTION INTRAVENOUS at 00:58

## 2023-05-12 RX ADMIN — CEPHALEXIN 500 MG: 500 CAPSULE ORAL at 18:40

## 2023-05-12 RX ADMIN — ATORVASTATIN CALCIUM 40 MG: 40 TABLET, FILM COATED ORAL at 21:43

## 2023-05-12 RX ADMIN — APIXABAN 5 MG: 5 TABLET, FILM COATED ORAL at 08:35

## 2023-05-12 RX ADMIN — SODIUM CHLORIDE, PRESERVATIVE FREE 10 ML: 5 INJECTION INTRAVENOUS at 21:44

## 2023-05-12 RX ADMIN — INSULIN LISPRO 1 UNITS: 100 INJECTION, SOLUTION INTRAVENOUS; SUBCUTANEOUS at 12:06

## 2023-05-12 RX ADMIN — APIXABAN 5 MG: 5 TABLET, FILM COATED ORAL at 21:43

## 2023-05-12 RX ADMIN — HYDROCODONE BITARTRATE AND ACETAMINOPHEN 1 TABLET: 5; 325 TABLET ORAL at 08:36

## 2023-05-12 RX ADMIN — CEPHALEXIN 500 MG: 500 CAPSULE ORAL at 06:08

## 2023-05-12 RX ADMIN — CEPHALEXIN 500 MG: 500 CAPSULE ORAL at 12:06

## 2023-05-12 RX ADMIN — HYDROCODONE BITARTRATE AND ACETAMINOPHEN 2 TABLET: 5; 325 TABLET ORAL at 14:36

## 2023-05-12 RX ADMIN — METOPROLOL SUCCINATE 100 MG: 50 TABLET, EXTENDED RELEASE ORAL at 08:32

## 2023-05-12 RX ADMIN — SODIUM CHLORIDE 5 MG/HR: 900 INJECTION, SOLUTION INTRAVENOUS at 16:45

## 2023-05-12 ASSESSMENT — PAIN DESCRIPTION - ONSET
ONSET: GRADUAL
ONSET: GRADUAL
ONSET: ON-GOING
ONSET: GRADUAL

## 2023-05-12 ASSESSMENT — PAIN DESCRIPTION - LOCATION
LOCATION: HIP;SACRUM
LOCATION: LEG

## 2023-05-12 ASSESSMENT — PAIN DESCRIPTION - FREQUENCY
FREQUENCY: INTERMITTENT
FREQUENCY: INTERMITTENT
FREQUENCY: CONTINUOUS
FREQUENCY: INTERMITTENT

## 2023-05-12 ASSESSMENT — PAIN SCALES - GENERAL
PAINLEVEL_OUTOF10: 9
PAINLEVEL_OUTOF10: 4
PAINLEVEL_OUTOF10: 3
PAINLEVEL_OUTOF10: 2

## 2023-05-12 ASSESSMENT — PAIN DESCRIPTION - PAIN TYPE
TYPE: ACUTE PAIN;CHRONIC PAIN
TYPE: ACUTE PAIN;CHRONIC PAIN

## 2023-05-12 ASSESSMENT — PAIN DESCRIPTION - DESCRIPTORS
DESCRIPTORS: ACHING
DESCRIPTORS: SORE;ACHING

## 2023-05-12 ASSESSMENT — PAIN DESCRIPTION - ORIENTATION
ORIENTATION: LEFT
ORIENTATION: LEFT
ORIENTATION: RIGHT;LEFT
ORIENTATION: LEFT

## 2023-05-12 ASSESSMENT — PAIN - FUNCTIONAL ASSESSMENT: PAIN_FUNCTIONAL_ASSESSMENT: PREVENTS OR INTERFERES SOME ACTIVE ACTIVITIES AND ADLS

## 2023-05-12 NOTE — PLAN OF CARE
Problem: Discharge Planning  Goal: Discharge to home or other facility with appropriate resources  5/12/2023 0952 by Enrique Darnell RN  Outcome: Progressing  Flowsheets (Taken 5/12/2023 0800)  Discharge to home or other facility with appropriate resources:   Identify barriers to discharge with patient and caregiver   Arrange for needed discharge resources and transportation as appropriate  5/12/2023 0452 by Louisa Chacon RN  Outcome: Progressing     Problem: Safety - Adult  Goal: Free from fall injury  5/12/2023 0952 by Enrique Darnell RN  Outcome: Progressing  Flowsheets (Taken 5/12/2023 0800)  Free From Fall Injury: Instruct family/caregiver on patient safety  5/12/2023 0452 by Louisa Chacon RN  Outcome: Progressing     Problem: Skin/Tissue Integrity  Goal: Absence of new skin breakdown  Description: 1. Monitor for areas of redness and/or skin breakdown  2. Assess vascular access sites hourly  3. Every 4-6 hours minimum:  Change oxygen saturation probe site  4. Every 4-6 hours:  If on nasal continuous positive airway pressure, respiratory therapy assess nares and determine need for appliance change or resting period.   5/12/2023 9985 by Enrique Darnell RN  Outcome: Progressing  5/12/2023 0452 by Louisa Chacon RN  Outcome: Progressing     Problem: ABCDS Injury Assessment  Goal: Absence of physical injury  5/12/2023 0952 by Enrique Darnell RN  Outcome: Progressing  Flowsheets (Taken 5/12/2023 0800)  Absence of Physical Injury: Implement safety measures based on patient assessment  5/12/2023 0452 by Louisa Chacon RN  Outcome: Progressing     Problem: Pain  Goal: Verbalizes/displays adequate comfort level or baseline comfort level  5/12/2023 0952 by Enrique Darnell RN  Outcome: Progressing  Flowsheets (Taken 5/12/2023 0800)  Verbalizes/displays adequate comfort level or baseline comfort level:   Encourage patient to monitor pain and request assistance   Assess pain using appropriate pain scale   Implement non-pharmacological

## 2023-05-12 NOTE — PROGRESS NOTES
Paged on-call cardiologist for [facility]. Linda Shukla returned the call. New orders for PO cardizem received, and to also shut off the cardizem gtt 1 hour after starting PO cardizem. Night shift made aware.

## 2023-05-12 NOTE — PROGRESS NOTES
Pt complaining of numbness and weakness in the LLE. When working with PT/OT, discovered he has a new left foot drop with numbness and decreased sensation. Dr. Marylin Bhatti notified and came to bedside to further assess. Lumbar spine MRI and neuro consult ordered. 1 Dr. Marleni Omalley notified of new consult.

## 2023-05-12 NOTE — PROGRESS NOTES
Occupational Therapy  Facility/Department: [facility]  Occupational Therapy Initial Assessment    Name: Trevor Schaeffer  : 1950  MRN: [id number]  Date of Service: 2023    RN reports patient is medically stable for therapy treatment this date. Chart reviewed prior to treatment and patient is agreeable for therapy. All lines intact and patient positioned comfortably at end of treatment. All patient needs addressed prior to ending therapy session. Pt is currently functional below baseline and recommend comprehensive and intensive skilled therapy by a multidisciplinary team. Would expect patient to be able to tolerate 3 hours of therapy per day and able to tolerate at least one hour up in chair. Please refer to AM-PAC score for current mobility/adl level. Discharge Recommendations:  Patient would benefit from continued therapy after discharge  OT Equipment Recommendations  Equipment Needed: Yes (CTA)  Mobility Devices: ADL Assistive Devices  ADL Assistive Devices: Transfer Tub Bench;Reacher;Long-handled Sponge (Pt owns a sock aid)       Per H&P: Trevor Schaeffer is a 74 y.o. Non- / non  male who presents with Hip Pain (Pt states he was at [facility] clinic to get a pain injection for his back when he fell, no LOC, and has pain in Lt hip 10/10, Pt on Blood thinners. ) and Tachycardia and is admitted to the hospital for the management of Atrial fibrillation with RVR ([address]). The patient states he was heading into management today related to chronic left hip pain when he fell. He denies LOC or feeling lightheaded or dizzy prior to the fall. He states he just fell. He did not hit his head. He states he has chronic left hip pain with numbness and tingling that's worse since the fall and that he scraped up his elbows. He told the ER physician that his heart was fast and he went down. He denies chest pain or shortness of breath. Take his morning meds including Toprol- mg p.o.

## 2023-05-12 NOTE — PROGRESS NOTES
Physical Therapy  Facility/Department: [facility]  Physical Therapy Initial Assessment    Name: Clifton Rodney  : 1950  MRN: [id number]  Date of Service: 2023    Discharge Recommendations:  Patient would benefit from continued therapy after discharge. Pt is currently functional below baseline and recommend comprehensive and intensive skilled therapy by a multidisciplinary team. Would expect patient to be able to tolerate 3 hours of therapy per day and able to tolerate at least one hour up in chair. Please refer to AM-PAC score for current mobility/adl level. HPI (per chart): The patient states he was heading into management today related to chronic left hip pain when he fell. He denies LOC or feeling lightheaded or dizzy prior to the fall. He states he just fell. He did not hit his head. He states he has chronic left hip pain with numbness and tingling that's worse since the fall and that he scraped up his elbows. He told the ER physician that his heart was fast and he went down. He denies chest pain or shortness of breath. Take his morning meds including Toprol- mg p.o. because he is whenever his injection pain management. Patient reports a history of a cardioversion at some point in the past.    Patient Diagnosis(es): The encounter diagnosis was Atrial fibrillation with RVR ([address]). Past Medical History:  has a past medical history of A-fib ([address]), Anxiety, Cardiomyopathy ([address]), Chronic obstructive pulmonary disease ([address]), Coronary artery disease involving native coronary artery of native heart without angina pectoris, and Hyperlipidemia. Past Surgical History:  has a past surgical history that includes Cardioversion; Colonoscopy; Cardiac catheterization; and Appendectomy. Assessment   Body Structures, Functions, Activity Limitations Requiring Skilled Therapeutic Intervention: Decreased functional mobility ; Decreased ADL status; Decreased ROM; Decreased strength;Decreased

## 2023-05-12 NOTE — PROGRESS NOTES
Comprehensive Nutrition Assessment    Type and Reason for Visit:  Initial    Nutrition Recommendations/Plan:   Provide MAYNOR, 2 gram, low fat/low cholesterol, high fiber. Provide supplements as ordered   Monitor labs as they become available   Monitor skin integrity/weights     Malnutrition Assessment:  Malnutrition Status:  Severe malnutrition (05/12/23 1441)    Context:  Chronic Illness     Findings of the 6 clinical characteristics of malnutrition:  Energy Intake:  75% or less estimated energy requirements for 1 month or longer  Weight Loss:  Greater than 10% over 6 months     Body Fat Loss:  Mild body fat loss Orbital   Muscle Mass Loss:  Mild muscle mass loss Hand (interosseous)  Fluid Accumulation:  No significant fluid accumulation     Strength:  Not Performed    Nutrition Assessment:    Patient is noted to be on a cardizem drip, had an uneventful night, is being seen for significant weight loss,  he is here for pain in left hip and A-fib, goal today is to decrease need for cardizem drip, on supplements BID provides 700 kcals and 26 grams of protein if both are 100% consumed,    Nutrition Related Findings:    severe malnutrition from weight loss, no edema noted, active sounds for bowels, on antibiotic for UTI. Wound Type: None       Current Nutrition Intake & Therapies:    Average Meal Intake: 26-50%  Average Supplements Intake: 26-50%  ADULT DIET; Regular; Low Fat/Low Chol/High Fiber/2 gm Na; No Added Salt (3-4 gm)  ADULT ORAL NUTRITION SUPPLEMENT; Breakfast, Dinner; Standard High Calorie/High Protein Oral Supplement    Anthropometric Measures:  Height: 5' 10\" (177.8 cm)  Ideal Body Weight (IBW): 166 lbs (75 kg)       Current Body Weight: 168 lb (76.2 kg), 101.2 % IBW. Weight Source: Bed Scale  Current BMI (kg/m2): 24.1        Weight Adjustment For: No Adjustment                 BMI Categories: Normal Weight (BMI 18.5-24. 9)    Estimated Daily Nutrient Needs:  Energy Requirements Based On: Kcal/kg  Weight

## 2023-05-12 NOTE — PROGRESS NOTES
Transitions of Care Pharmacy Service   Medication Review    The patient's list of current home medications has been reviewed. Source(s) of information: Patient/ Surescripts    Based on information provided by the above source(s), I have updated the patient's home med list as described below. Please review the ACTION REQUESTED section of this note below for any discrepancies on current hospital orders. I changed or updated the following medications on the patient's home medication list:  Removed none     Added none     Adjusted   Aldactone 25mg (no directions) to 1/2 tab (12.5mg) PO QD   Other Notes none         PROVIDER ACTION REQUESTED  Medications that need to be addressed by a physician/nurse practitioner:    Medication Action Requested   Aldactone     Please adjust to home dose as appropriate         Please feel free to call me with any questions about this encounter. Thank you.     Tj Harmon [facility]   Transitions of Care Pharmacy Service  Phone:  [phone]  Fax: [phone]      Electronically signed by Tj Harmon [facility] on 5/12/2023 at 3:21 PM           Medications Prior to Admission: atorvastatin (LIPITOR) 40 MG tablet, 1 tablet Orally Once a day  apixaban (ELIQUIS) 5 MG TABS tablet, Take 1 tablet by mouth 2 times daily  metoprolol succinate (TOPROL XL) 100 MG extended release tablet, 1 tablet Orally Once a day  Multiple Vitamins-Minerals (THERAPEUTIC MULTIVITAMIN-MINERALS) tablet, Take 1 tablet by mouth daily  lisinopril (PRINIVIL;ZESTRIL) 5 MG tablet, 1 tablet Orally Once a day  clopidogrel (PLAVIX) 75 MG tablet, Take 1 tablet by mouth daily  spironolactone (ALDACTONE) 25 MG tablet, Take 0.5 tablets by mouth daily

## 2023-05-12 NOTE — PROGRESS NOTES
[facility]  Office: 7900  1826, DO, Kelsie Mcclellandter, DO, Harinder Luna, DO, [facility] Blood, DO, Avril Benavides MD, Evaristo Palmer MD, Kwadwo Bach MD, Rocio Aguilera MD,  Dallas Nieves MD, Namita Castillo MD, Janina Jacobs, DO, Lenny Rendon MD,  Danielle Aragon MD, Gail Miles MD, Monik Obrien, DO, Elizabeth Braun MD, Alicia Curran MD, Ignacio Pepper, DO, Johanna Vogel MD, Olesya Selby MD, Fito Trinidad MD, Alexi Reddy MD,  Vidal Spear, DO, Raisa Tejeda MD,  Grupo Salinas, CNP,  Christian Tineo, CNP, Luis Eduardo Guevara, CNP, Armand Soliz, CNP,  Jenna Bermudez, [facility], Cory Barnes, CNP, Enrrique Florentino, CNP, Jamar Canas, CNP, Dunia Diaz, CNP, Bimal Buckley, CNP, Charles Solar PAAdelsoC, Amish Varela, [facility], Julian Smith, CNP, Ben Laws, [facility]    Progress Note    5/12/2023    4:07 PM    Name:   Daniela Kumar  MRN:     [id number]     [address]:      [de-identified]   Room:   2027/2027-01   Day:  1  Admit Date:  5/11/2023 10:32 AM    PCP:   Teresa Thayer MD  Code Status:  Full Code    Subjective:     Patient is feeling better today. He has no dizziness. HR I still elevated 113 but he doesn't have any symptoms. Scr is improved. Platelets are slightly low today 128, rhythm still irregular but rate controlled. Pt did have some foot drop and worsening weakness this morning. No loss of bowel bladder function. Did have some numbness in leg. Brief History: This is a 73-year-old male who presents to the hospital for evaluation after falling. He says prior to fall patient was feeling lightheaded and dizzy, denies losing consciousness or hitting his head. He was found to be in atrial fibrillation with rapid ventricular response. He was started on Cardizem drip and his home Toprol was restarted.  Echocardiogram showed: Cardiology evaluated patient and recommended: with pain management   Hyperglycemia : without history of diabetes. Monitor with ISS, check A1c .  Tentative plan for empagliflozin given CHF    Medical Decision Makin [facility], DO  2023  4:07 PM

## 2023-05-13 ENCOUNTER — APPOINTMENT (OUTPATIENT)
Dept: CT IMAGING | Age: 73
DRG: 477 | End: 2023-05-13
Payer: MEDICARE

## 2023-05-13 PROBLEM — M54.42 ACUTE LEFT-SIDED LOW BACK PAIN WITH LEFT-SIDED SCIATICA: Status: ACTIVE | Noted: 2023-05-13

## 2023-05-13 PROBLEM — R93.7 ABNORMAL MAGNETIC RESONANCE IMAGING OF LUMBAR SPINE: Status: ACTIVE | Noted: 2023-05-13

## 2023-05-13 PROBLEM — M21.372 LEFT FOOT DROP: Status: ACTIVE | Noted: 2023-05-13

## 2023-05-13 LAB
ABSOLUTE EOS #: 0.2 K/UL (ref 0–0.44)
ABSOLUTE IMMATURE GRANULOCYTE: 0.06 K/UL (ref 0–0.3)
ABSOLUTE LYMPH #: 1.93 K/UL (ref 1.1–3.7)
ABSOLUTE MONO #: 1.24 K/UL (ref 0.1–1.2)
ANION GAP SERPL CALCULATED.3IONS-SCNC: 11 MMOL/L (ref 9–17)
BASOPHILS # BLD: 0 % (ref 0–2)
BASOPHILS ABSOLUTE: 0.03 K/UL (ref 0–0.2)
BUN SERPL-MCNC: 18 MG/DL (ref 8–23)
BUN/CREAT BLD: 32 (ref 9–20)
CALCIUM SERPL-MCNC: 9 MG/DL (ref 8.6–10.4)
CHLORIDE SERPL-SCNC: 101 MMOL/L (ref 98–107)
CO2 SERPL-SCNC: 25 MMOL/L (ref 20–31)
CREAT SERPL-MCNC: 0.56 MG/DL (ref 0.7–1.2)
EOSINOPHILS RELATIVE PERCENT: 2 % (ref 1–4)
FREE KAPPA/LAMBDA RATIO: 1.05 (ref 0.26–1.65)
GFR SERPL CREATININE-BSD FRML MDRD: >60 ML/MIN/1.73M2
GLUCOSE BLD-MCNC: 135 MG/DL (ref 75–110)
GLUCOSE BLD-MCNC: 146 MG/DL (ref 75–110)
GLUCOSE BLD-MCNC: 161 MG/DL (ref 75–110)
GLUCOSE BLD-MCNC: 313 MG/DL (ref 75–110)
GLUCOSE SERPL-MCNC: 157 MG/DL (ref 70–99)
HCT VFR BLD AUTO: 36.9 % (ref 40.7–50.3)
HGB BLD-MCNC: 12.5 G/DL (ref 13–17)
IMMATURE GRANULOCYTES: 1 %
KAPPA LC FREE SER-MCNC: 1.57 MG/DL (ref 0.37–1.94)
LAMBDA LC FREE SERPL-MCNC: 1.49 MG/DL (ref 0.57–2.63)
LV EF: 55 %
LVEF MODALITY: NORMAL
LYMPHOCYTES # BLD: 15 % (ref 24–43)
MCH RBC QN AUTO: 32.7 PG (ref 25.2–33.5)
MCHC RBC AUTO-ENTMCNC: 33.9 G/DL (ref 28.4–34.8)
MCV RBC AUTO: 96.6 FL (ref 82.6–102.9)
MONOCYTES # BLD: 9 % (ref 3–12)
NRBC AUTOMATED: 0 PER 100 WBC
PDW BLD-RTO: 13.9 % (ref 11.8–14.4)
PLATELET # BLD AUTO: 153 K/UL (ref 138–453)
PMV BLD AUTO: 9.7 FL (ref 8.1–13.5)
POTASSIUM SERPL-SCNC: 4.3 MMOL/L (ref 3.7–5.3)
PROSTATE SPECIFIC ANTIGEN: 0.67 NG/ML
RBC # BLD: 3.82 M/UL (ref 4.21–5.77)
SEG NEUTROPHILS: 73 % (ref 36–65)
SEGMENTED NEUTROPHILS ABSOLUTE COUNT: 9.86 K/UL (ref 1.5–8.1)
SODIUM SERPL-SCNC: 137 MMOL/L (ref 135–144)
WBC # BLD AUTO: 13.3 K/UL (ref 3.5–11.3)

## 2023-05-13 PROCEDURE — 6370000000 HC RX 637 (ALT 250 FOR IP): Performed by: NURSE PRACTITIONER

## 2023-05-13 PROCEDURE — 36415 COLL VENOUS BLD VENIPUNCTURE: CPT

## 2023-05-13 PROCEDURE — 84165 PROTEIN E-PHORESIS SERUM: CPT

## 2023-05-13 PROCEDURE — 99223 1ST HOSP IP/OBS HIGH 75: CPT | Performed by: INTERNAL MEDICINE

## 2023-05-13 PROCEDURE — 2500000003 HC RX 250 WO HCPCS: Performed by: INTERNAL MEDICINE

## 2023-05-13 PROCEDURE — 85025 COMPLETE CBC W/AUTO DIFF WBC: CPT

## 2023-05-13 PROCEDURE — 83521 IG LIGHT CHAINS FREE EACH: CPT

## 2023-05-13 PROCEDURE — 71260 CT THORAX DX C+: CPT

## 2023-05-13 PROCEDURE — 84153 ASSAY OF PSA TOTAL: CPT

## 2023-05-13 PROCEDURE — 2580000003 HC RX 258: Performed by: INTERNAL MEDICINE

## 2023-05-13 PROCEDURE — 70450 CT HEAD/BRAIN W/O DYE: CPT

## 2023-05-13 PROCEDURE — 6370000000 HC RX 637 (ALT 250 FOR IP): Performed by: PSYCHIATRY & NEUROLOGY

## 2023-05-13 PROCEDURE — 99233 SBSQ HOSP IP/OBS HIGH 50: CPT | Performed by: INTERNAL MEDICINE

## 2023-05-13 PROCEDURE — 6360000002 HC RX W HCPCS: Performed by: INTERNAL MEDICINE

## 2023-05-13 PROCEDURE — 2580000003 HC RX 258: Performed by: NURSE PRACTITIONER

## 2023-05-13 PROCEDURE — 6370000000 HC RX 637 (ALT 250 FOR IP): Performed by: INTERNAL MEDICINE

## 2023-05-13 PROCEDURE — 82947 ASSAY GLUCOSE BLOOD QUANT: CPT

## 2023-05-13 PROCEDURE — 93306 TTE W/DOPPLER COMPLETE: CPT

## 2023-05-13 PROCEDURE — 99223 1ST HOSP IP/OBS HIGH 75: CPT | Performed by: PSYCHIATRY & NEUROLOGY

## 2023-05-13 PROCEDURE — 2060000000 HC ICU INTERMEDIATE R&B

## 2023-05-13 PROCEDURE — 80048 BASIC METABOLIC PNL TOTAL CA: CPT

## 2023-05-13 PROCEDURE — 82232 ASSAY OF BETA-2 PROTEIN: CPT

## 2023-05-13 PROCEDURE — 6370000000 HC RX 637 (ALT 250 FOR IP): Performed by: PHYSICIAN ASSISTANT

## 2023-05-13 PROCEDURE — 6360000004 HC RX CONTRAST MEDICATION: Performed by: INTERNAL MEDICINE

## 2023-05-13 PROCEDURE — 84155 ASSAY OF PROTEIN SERUM: CPT

## 2023-05-13 PROCEDURE — 86334 IMMUNOFIX E-PHORESIS SERUM: CPT

## 2023-05-13 RX ORDER — HYDROCODONE BITARTRATE AND ACETAMINOPHEN 5; 325 MG/1; MG/1
2 TABLET ORAL EVERY 6 HOURS PRN
Status: DISCONTINUED | OUTPATIENT
Start: 2023-05-13 | End: 2023-05-17 | Stop reason: HOSPADM

## 2023-05-13 RX ORDER — SOTALOL HYDROCHLORIDE 80 MG/1
80 TABLET ORAL 2 TIMES DAILY
Status: DISCONTINUED | OUTPATIENT
Start: 2023-05-13 | End: 2023-05-17 | Stop reason: HOSPADM

## 2023-05-13 RX ORDER — HYDROCODONE BITARTRATE AND ACETAMINOPHEN 5; 325 MG/1; MG/1
1 TABLET ORAL EVERY 4 HOURS PRN
Status: DISCONTINUED | OUTPATIENT
Start: 2023-05-13 | End: 2023-05-17 | Stop reason: HOSPADM

## 2023-05-13 RX ORDER — DEXAMETHASONE SODIUM PHOSPHATE 4 MG/ML
4 INJECTION, SOLUTION INTRA-ARTICULAR; INTRALESIONAL; INTRAMUSCULAR; INTRAVENOUS; SOFT TISSUE EVERY 6 HOURS
Status: DISCONTINUED | OUTPATIENT
Start: 2023-05-13 | End: 2023-05-15

## 2023-05-13 RX ORDER — LIDOCAINE 4 G/G
1 PATCH TOPICAL DAILY
Status: DISCONTINUED | OUTPATIENT
Start: 2023-05-13 | End: 2023-05-17 | Stop reason: HOSPADM

## 2023-05-13 RX ORDER — SODIUM CHLORIDE 0.9 % (FLUSH) 0.9 %
10 SYRINGE (ML) INJECTION PRN
Status: DISCONTINUED | OUTPATIENT
Start: 2023-05-13 | End: 2023-05-17 | Stop reason: HOSPADM

## 2023-05-13 RX ORDER — 0.9 % SODIUM CHLORIDE 0.9 %
80 INTRAVENOUS SOLUTION INTRAVENOUS ONCE
Status: COMPLETED | OUTPATIENT
Start: 2023-05-13 | End: 2023-05-13

## 2023-05-13 RX ORDER — GABAPENTIN 100 MG/1
200 CAPSULE ORAL 3 TIMES DAILY
Status: DISCONTINUED | OUTPATIENT
Start: 2023-05-13 | End: 2023-05-17

## 2023-05-13 RX ADMIN — DILTIAZEM HYDROCHLORIDE 120 MG: 120 CAPSULE, COATED, EXTENDED RELEASE ORAL at 09:57

## 2023-05-13 RX ADMIN — HYDROCODONE BITARTRATE AND ACETAMINOPHEN 1 TABLET: 5; 325 TABLET ORAL at 02:13

## 2023-05-13 RX ADMIN — Medication 12.5 MG: at 09:57

## 2023-05-13 RX ADMIN — HYDROCODONE BITARTRATE AND ACETAMINOPHEN 1 TABLET: 5; 325 TABLET ORAL at 15:40

## 2023-05-13 RX ADMIN — GABAPENTIN 200 MG: 100 CAPSULE ORAL at 15:40

## 2023-05-13 RX ADMIN — SODIUM CHLORIDE, PRESERVATIVE FREE 10 ML: 5 INJECTION INTRAVENOUS at 09:58

## 2023-05-13 RX ADMIN — METOPROLOL SUCCINATE 150 MG: 50 TABLET, EXTENDED RELEASE ORAL at 09:57

## 2023-05-13 RX ADMIN — SODIUM CHLORIDE 80 ML: 0.9 INJECTION, SOLUTION INTRAVENOUS at 13:46

## 2023-05-13 RX ADMIN — GABAPENTIN 200 MG: 100 CAPSULE ORAL at 09:57

## 2023-05-13 RX ADMIN — DEXAMETHASONE SODIUM PHOSPHATE 4 MG: 4 INJECTION, SOLUTION INTRAMUSCULAR; INTRAVENOUS at 15:35

## 2023-05-13 RX ADMIN — INSULIN LISPRO 4 UNITS: 100 INJECTION, SOLUTION INTRAVENOUS; SUBCUTANEOUS at 20:53

## 2023-05-13 RX ADMIN — SODIUM CHLORIDE, PRESERVATIVE FREE 10 ML: 5 INJECTION INTRAVENOUS at 21:14

## 2023-05-13 RX ADMIN — ATORVASTATIN CALCIUM 40 MG: 40 TABLET, FILM COATED ORAL at 20:52

## 2023-05-13 RX ADMIN — BARIUM SULFATE 450 ML: 20 SUSPENSION ORAL at 13:48

## 2023-05-13 RX ADMIN — DEXAMETHASONE SODIUM PHOSPHATE 4 MG: 4 INJECTION, SOLUTION INTRAMUSCULAR; INTRAVENOUS at 09:58

## 2023-05-13 RX ADMIN — DEXAMETHASONE SODIUM PHOSPHATE 4 MG: 4 INJECTION, SOLUTION INTRAMUSCULAR; INTRAVENOUS at 20:52

## 2023-05-13 RX ADMIN — Medication 12.5 MG: at 02:15

## 2023-05-13 RX ADMIN — CEPHALEXIN 500 MG: 500 CAPSULE ORAL at 05:33

## 2023-05-13 RX ADMIN — SODIUM CHLORIDE, PRESERVATIVE FREE 10 ML: 5 INJECTION INTRAVENOUS at 13:47

## 2023-05-13 RX ADMIN — HYDROCODONE BITARTRATE AND ACETAMINOPHEN 2 TABLET: 5; 325 TABLET ORAL at 21:14

## 2023-05-13 RX ADMIN — GABAPENTIN 200 MG: 100 CAPSULE ORAL at 20:52

## 2023-05-13 RX ADMIN — IOPAMIDOL 75 ML: 755 INJECTION, SOLUTION INTRAVENOUS at 13:46

## 2023-05-13 ASSESSMENT — PAIN SCALES - GENERAL
PAINLEVEL_OUTOF10: 7
PAINLEVEL_OUTOF10: 7
PAINLEVEL_OUTOF10: 3

## 2023-05-13 ASSESSMENT — PAIN DESCRIPTION - DESCRIPTORS
DESCRIPTORS: ACHING

## 2023-05-13 ASSESSMENT — PAIN DESCRIPTION - ORIENTATION
ORIENTATION: MID
ORIENTATION: MID
ORIENTATION: RIGHT;LEFT

## 2023-05-13 ASSESSMENT — PAIN DESCRIPTION - LOCATION
LOCATION: BACK;HIP
LOCATION: HIP
LOCATION: BACK

## 2023-05-13 ASSESSMENT — PAIN - FUNCTIONAL ASSESSMENT: PAIN_FUNCTIONAL_ASSESSMENT: ACTIVITIES ARE NOT PREVENTED

## 2023-05-13 NOTE — CONSULTS
[facility]   IN-PATIENT SERVICE      NEUROLOGY CONSULT  NOTE            Date:   5/13/2023  Patient name:  Bi Rodriguez  Date of admission:  5/11/2023  YOB: 1950      Chief Complaint:     Chief Complaint   Patient presents with    Hip Pain     Pt states he was at [facility] clinic to get a pain injection for his back when he fell, no LOC, and has pain in Lt hip 10/10, Pt on Blood thinners. Tachycardia       Reason for Consult:      Left foot drop    History of Present Illness: The patient is a 74 y.o. male who presents with Hip Pain (Pt states he was at [facility] clinic to get a pain injection for his back when he fell, no LOC, and has pain in Lt hip 10/10, Pt on Blood thinners. ) and Tachycardia  . The patient was seen and examined and the chart was reviewed. Patient is right handed. This patient is able to provide information on his own behalf. Patient's chart has also been reviewed. Case has also been reviewed with attending physician. Patient has had about 2 months of left hip pain which appears to be severe. He had been receiving left hip injections but then developed significant back pain and was about to receive back injections at [facility]. Patient apparently fell and was noted to have a left foot drop. Patient was admitted to the hospital on 5/11/2023 for tachycardia. He has known atrial fibrillation and is on Eliquis 5 mg p.o. twice daily, Plavix 75 mg p.o. daily. He also receives Lipitor 40 mg p.o. daily, lisinopril 5 mg daily Toprol  mg daily Aldactone 25 mg 0.5 tablets daily and a multivitamin. Patient also been receiving antibiotic therapy for urinary tract infection. Patient had foul-smelling urine and evidence of possible urinary obstruction. Patient has been evaluated for possible prostate cancer. PSAs appear to be in the range of 0.4.   No definite diagnosis of prostate cancer seems to be listed on the chart although that has been a

## 2023-05-13 NOTE — FLOWSHEET NOTE
05/13/23 0130   Assessment   Charting Type Reassessment   Neurological   Neuro (WDL) X   Level of Consciousness 0   Orientation Level Oriented to person;Disoriented to place; Disoriented to time;Oriented to situation   Norma Coma Scale   Eye Opening 4   Best Verbal Response 4   Best Motor Response 6   [place] Coma Scale Score 14   Rhythm Interpretation   Pulse (!) 117     0130  Patient was found sitting in the chair next to the restroom. He stated he was tied up with wires, feeling hot, and thought it was 0600 at an apartment. The PCT had gotten him back to bed and he was re-oriented to his surroundings.

## 2023-05-13 NOTE — PLAN OF CARE
Discussed MRI findings with  at [address] [place] [address], at this time thinks patient will be a better candidate for radiation.  He did recommend checking MRI cervical spine, thoracic, and lumbar with and without contrast. He will review imaging and give further recommendations after

## 2023-05-13 NOTE — PLAN OF CARE
Problem: Discharge Planning  Goal: Discharge to home or other facility with appropriate resources  Recent Flowsheet Documentation  Taken 5/13/2023 [id number] by Dima Levine RN  Discharge to home or other facility with appropriate resources:   Identify barriers to discharge with patient and caregiver   Arrange for needed discharge resources and transportation as appropriate  Problem: Safety - Adult  Goal: Free from fall injury  Outcome: Progressing  Note: Continue fall precautions including arm band identification, falling star placed outside of the room and alarm at night and when unattended. Use of ambulatory aids when indicated and frequent visual checks. Frequent nursing rounds. Monitored orientation status. Bed remains in lowest locked position. Instructed to call for assistance prior to getting OOB. Fall sign posted outside of door. Problem: Skin/Tissue Integrity  Goal: Absence of new skin breakdown  Description: 1. Monitor for areas of redness and/or skin breakdown  2. Assess vascular access sites hourly  3. Every 4-6 hours minimum:  Change oxygen saturation probe site  4. Every 4-6 hours:  If on nasal continuous positive airway pressure, respiratory therapy assess nares and determine need for appliance change or resting period. Outcome: Progressing  Note: Skin assessment performed and charted. Assessed for areas of redness and or breakdown. Patient is in considerable discomfort even after comfort measures initiated and is not receptive at this time to frequent position changes. Encouraged oral intake. Monitored nutritional intake from meal trays. Assessed oral cavity. Assessed for areas of redness, open sores, thrush and dentition. Instructed on the importance of position changes for comfort. Problem: Pain  Goal: Verbalizes/displays adequate comfort level or baseline comfort level  Outcome: Progressing  Note: Comfort level assessed at frequent intervals. Medicated PRN.  Encouraged the use of

## 2023-05-13 NOTE — PROGRESS NOTES
[facility]  Office: 7900  1826, DO, Tommie Ba, DO, Radha Ro, DO, Tasha Stiles Blood, DO, Yoshi Morales MD, Severo Lopes, MD, Ondina Jones MD, Mireya Nava MD,  Cathy Larry MD, Fernando Collazo MD, Nat Overton DO, Aparna Saini MD,  Yissel Pope MD, Jame Hurst MD, Monse Chao DO, Tate Lundberg MD, Santiago Willis MD, Elvira Dominguez DO, Adilia Salazar MD, Austin De Oliveira MD, Thania Gallardo MD, Marjorie Hall MD,  Huy Griffin DO, Stephanie Vasquez MD,  Saeid Lee, CNP,  Herrera Laurent, CNP, Lizzie Ortiz, CNP, Shy Wilde, CNP,  Oscar Mejias, [facility], Ryan Benson, CNP, Justus Mccarty, CNP, Clifton Avila, CNP, Frank Regalado, CNP, Michael Mccord, CNP, Mohan Maynard PA-C, Eder Bowman, CNS, Yariel Meier, CNP, Fatou Lopez, [address] [facility] [address]    Progress Note    5/13/2023    9:31 AM    Name:   Lizeth Stephens  MRN:     [id number]     [address]:      [de-identified]   Room:   2027/2027-01  IP Day:  2  Admit Date:  5/11/2023 10:32 AM    PCP:   Christel Degroot MD  Code Status:  Full Code    Subjective: This morning, patient continues to have some lower extremity weakness and numbness. I did discuss results of the MRI with him. Today he says that he is actually had numbness in his left leg for 2 months and the foot drop is also been going on for 2 months. He says that his weakness is worse after the fall and currently is having difficulty ambulating. He says he had bowel movements and is urinating without any issues. He has a history of prostate cancer but cannot give me any information regarding treatment. His heart rate is better controlled today. He has no chest pain, shortness of breath, difficulty breathing. No nausea, vomiting, diarrhea. Heart rate is still irregular and he is in A-fib has no palpitations       Brief History:      This is a 47-year-old male who compression of the left L5 and S1 nerve roots  I did reach out to access center for neurosurgery consultation at [facility]  We will start steroids and gabapentin  Consult oncology. I did discuss case with neurology  Monitor for any signs of urinary retention with bladder scanning. Repeat PSA check multiple abnormal work-up  Dizziness and fall likely due to Atrial fibrillation with RVR  Heart rate is better controlled today. He was taken off of Cardizem drip and started on oral Cardizem. Toprol-XL was increased to 150 milligrams daily  Echocardiogram pending  UTI  Continue Keflex   History of LV dysfunction   Last EF in  was 55-60%  Thrombocytopenia   Resolved  History of CAD with EBER to LAD in 3/2021 has residual nonobstructive lesions in RCA and CX  Reviewed in care everywhere note from his cardiologist  Continue on Plavix, Lipitor   COPD without exacerbation   Continue current treatment  Dyslipidemia : statin  Chronic hip pain: follows with pain management   Hyperglycemia : without history of diabetes. Monitor with ISS, check A1c .  Tentative plan for empagliflozin given CHF    Medical Decision Makin [facility], DO  2023  9:31 AM

## 2023-05-13 NOTE — PROGRESS NOTES
Writer spoke with wife on the phone, wife expressed concerns over patient's weight loss and stated that patient \" has been very short with her\" when she calls. Reassurance offered, explained that at this point imaging is still teto completed and awaiting results.

## 2023-05-13 NOTE — PROGRESS NOTES
[address]  Occupational Therapy Not Seen    DATE: 2023    NAME: Yesica Elmore  MRN: [id number]   : 1950    Patient not seen this date for Occupational Therapy due to:      [x] Cancel by RN or physician due to: RN stated pt is in too much pain and is having testing today due to possible bone mets to spine. Will cont to follow and check with RN prior to seeing pt for next visit. [] Hemodialysis    [] Critical Lab Value Level     [] Blood transfusion in progress    [] Acute or unstable cardiovascular status   _MAP < 55 or more than >115  _HR < 40 or > 130    [] Acute or unstable pulmonary status   -FiO2 > 60%   _RR < 5 or >40    _O2 sats < 85%    [] Strict Bedrest    [] Off Unit for surgery or procedure    [] Off Unit for testing       [] Pending imaging to R/O fracture    [] Refusal by Patient      [] Other      [] OT being discontinued at this time. Patient independent. No further needs. [] OT being discontinued at this time as the patient has been transferred to hospice care. No further needs.       DALE Arredondo

## 2023-05-13 NOTE — PROGRESS NOTES
Writer present in room with Neurologist while patient examined. Patient is Alert and oriented x4, minimal forgetfulness when requested to repeat 3 items listed to him at the beginning of the neurological examination. Patient relaying a lot of pain in bilateral hips following fall that occurred prior to admission. Patient also c/o severe R. Heel pain. Writer placed pillow under lower leg to prevent heel from touching bed. Patient complains of lots of numbness in L . Leg. Patient able to follow all commands. CT scans have been ordered for patient.

## 2023-05-13 NOTE — CONSULTS
[address] Amandeep Yi, [facility]  [phone]               Cardiology Consult           Date of Admission:  5/11/2023  Date of Consultation:  5/13/2023      PCP:  Christel Degroot MD      Chief Complaint: Asked to see patient regarding atrial fibrillation    History of Present Illness:  Lizeth Stephens is a 74 y.o. male who presents with mechanical fall. Found to be in atrial fibrillation with RVR. History of: Persistent AFib +DC cardioversion 12/2020 . Was in sinus rhythm at least up to 3/31/23 when seen in office. LV systolic dysfunction  Mixed cardiomyopathy: Ischemic +tachycardia induced  Subsequent normalization of LV function post revascularization and sinus rhythm. Atherosclerotic CAD with EBER-lad 03/2021   Residual nonobstructive lesions: RCA +CX   Chads Vasc 4 on Eliquis   Essential HTN  HL P.   Prostate cancer  Has been having quite  a lot of low back pain with left foot numbness and foot drop for the past couple months. Held Eliquis 3 days and underwent back injection on Thursday. After injection, leaned to get wheelchair and fell, hurting hip. Brought to hospital and noted to be in afib. Heart rate at this point seems modestly controlled on toprol and Cardizem. Stable hemodynamics. Unfortunately noted to have possible malignant lesions on CT/MRI scan. Sacrum to be biopsied and Eliquis and Plavix held again. Denies chest pain, palpitations, SOB. No new neurologic symptoms. PMH:   has a past medical history of A-fib ([address]), Anxiety, Cardiomyopathy ([address]), Chronic obstructive pulmonary disease ([address]), Coronary artery disease involving native coronary artery of native heart without angina pectoris, and Hyperlipidemia. PSH:   has a past surgical history that includes Cardioversion; Colonoscopy; Cardiac catheterization; and Appendectomy.     Allergies:  No Known Allergies     Home Meds:    Prior to Admission medications    Medication Sig Start Date End anticoagulation for 30 days. REsuming Eliquis when procedures done. CAD. EBER to LAD > 1 year ago. No objection to holding Plavix for procedures, but resume when able. Would keep on just long term plavix with the Eluiquis (not ASA and Plavix)  Normalized LV function. Not particularly volume overloaded; would not add mor diuretic. Probable metastatic ca. Bx pending. Your other diagnoses.

## 2023-05-13 NOTE — PROGRESS NOTES
DATE: 2023    NAME: Rere Gusman  MRN: [id number]   : 1950    Patient not seen this date for Physical Therapy due to:      [x] Cancel by RN or physician due to:    Nurse states pt is in pain and  going for a CT this date for possible mets to spine. Will cont to follow. [] Hemodialysis    [] Critical Lab Value Level     [] Blood transfusion in progress    [] Acute or unstable cardiovascular status   _MAP < 55 or more than >115  _HR < 40 or > 130    [] Acute or unstable pulmonary status   -FiO2 > 60%   _RR < 5 or >40    _O2 sats < 85%    [] Strict Bedrest    [] Off Unit for surgery or procedure    [] Off Unit for testing       [] Pending imaging to R/O fracture    [] Refusal by Patient      [] Other      [] PT being discontinued at this time. Patient independent. No further needs. [] PT being discontinued at this time as the patient has been transferred to hospice care. No further needs.       Ana Johnson, PTA

## 2023-05-13 NOTE — FLOWSHEET NOTE
05/12/23 2230   Treatment Team Notification   Reason for Communication Evaluate; Review case   Team Member Name [facility] cardiology)   Treatment Team Role Physician Assistant   Method of Communication Call   Response Waiting for response   Notification Time 65     Carmeloernesto Diana 75yo male with an admitting dx of afib rvr. Currently still in afib. He received first dose PO cardizem 120mg at 1850 and cardizem gtt was turned off one hour later. Three hours later, his HR has occasionally spiked to 140s briefly and drop back down 110s. Patient is asymptomatic and at rest in bed. He is to start metoprolol 150mg tmw, previous home dose was 100mg. Ordered one time dose 12.5mg Lopressor PO to hold over until morning dose of lopressor. If sustaining a HR of 130, administer 6.25mg lopressor PO. Do not give with low BP. Page again if unable to administer this 5mg lopressor. 11-Mar-2022 21:41

## 2023-05-14 PROBLEM — C79.51 MALIGNANT NEOPLASM METASTATIC TO BONE (HCC): Status: ACTIVE | Noted: 2023-05-14

## 2023-05-14 PROBLEM — R29.898 WEAKNESS OF BOTH LOWER EXTREMITIES: Status: ACTIVE | Noted: 2023-05-14

## 2023-05-14 PROBLEM — M54.16 COMPRESSION OF LUMBAR NERVE ROOT: Status: ACTIVE | Noted: 2023-05-14

## 2023-05-14 PROBLEM — R73.9 HYPERGLYCEMIA: Status: ACTIVE | Noted: 2023-05-14

## 2023-05-14 PROBLEM — G54.8: Status: ACTIVE | Noted: 2023-05-14

## 2023-05-14 PROBLEM — C78.01 MALIGNANT NEOPLASM METASTATIC TO BOTH LUNGS (HCC): Status: ACTIVE | Noted: 2023-05-14

## 2023-05-14 PROBLEM — N30.00 ACUTE CYSTITIS WITHOUT HEMATURIA: Status: ACTIVE | Noted: 2023-05-14

## 2023-05-14 PROBLEM — C78.02 MALIGNANT NEOPLASM METASTATIC TO BOTH LUNGS (HCC): Status: ACTIVE | Noted: 2023-05-14

## 2023-05-14 LAB
GLUCOSE BLD-MCNC: 177 MG/DL (ref 75–110)
GLUCOSE BLD-MCNC: 183 MG/DL (ref 75–110)
GLUCOSE BLD-MCNC: 243 MG/DL (ref 75–110)
GLUCOSE BLD-MCNC: 278 MG/DL (ref 75–110)

## 2023-05-14 PROCEDURE — 93005 ELECTROCARDIOGRAM TRACING: CPT | Performed by: INTERNAL MEDICINE

## 2023-05-14 PROCEDURE — 2580000003 HC RX 258: Performed by: NURSE PRACTITIONER

## 2023-05-14 PROCEDURE — 2580000003 HC RX 258: Performed by: INTERNAL MEDICINE

## 2023-05-14 PROCEDURE — 6370000000 HC RX 637 (ALT 250 FOR IP): Performed by: NURSE PRACTITIONER

## 2023-05-14 PROCEDURE — 6370000000 HC RX 637 (ALT 250 FOR IP): Performed by: PSYCHIATRY & NEUROLOGY

## 2023-05-14 PROCEDURE — 2060000000 HC ICU INTERMEDIATE R&B

## 2023-05-14 PROCEDURE — 99232 SBSQ HOSP IP/OBS MODERATE 35: CPT | Performed by: PSYCHIATRY & NEUROLOGY

## 2023-05-14 PROCEDURE — 6370000000 HC RX 637 (ALT 250 FOR IP): Performed by: INTERNAL MEDICINE

## 2023-05-14 PROCEDURE — 2500000003 HC RX 250 WO HCPCS: Performed by: NURSE PRACTITIONER

## 2023-05-14 PROCEDURE — 82947 ASSAY GLUCOSE BLOOD QUANT: CPT

## 2023-05-14 PROCEDURE — 99232 SBSQ HOSP IP/OBS MODERATE 35: CPT | Performed by: INTERNAL MEDICINE

## 2023-05-14 PROCEDURE — 6360000002 HC RX W HCPCS: Performed by: INTERNAL MEDICINE

## 2023-05-14 RX ORDER — ENOXAPARIN SODIUM 100 MG/ML
1 INJECTION SUBCUTANEOUS 2 TIMES DAILY
Status: DISCONTINUED | OUTPATIENT
Start: 2023-05-14 | End: 2023-05-15

## 2023-05-14 RX ORDER — PANTOPRAZOLE SODIUM 40 MG/1
40 TABLET, DELAYED RELEASE ORAL
Status: DISCONTINUED | OUTPATIENT
Start: 2023-05-14 | End: 2023-05-17

## 2023-05-14 RX ORDER — DILTIAZEM HYDROCHLORIDE 5 MG/ML
5 INJECTION INTRAVENOUS ONCE
Status: COMPLETED | OUTPATIENT
Start: 2023-05-14 | End: 2023-05-14

## 2023-05-14 RX ORDER — METOPROLOL SUCCINATE 50 MG/1
150 TABLET, EXTENDED RELEASE ORAL DAILY
Status: DISCONTINUED | OUTPATIENT
Start: 2023-05-14 | End: 2023-05-17 | Stop reason: HOSPADM

## 2023-05-14 RX ADMIN — DEXAMETHASONE SODIUM PHOSPHATE 4 MG: 4 INJECTION, SOLUTION INTRAMUSCULAR; INTRAVENOUS at 09:37

## 2023-05-14 RX ADMIN — SODIUM CHLORIDE, PRESERVATIVE FREE 10 ML: 5 INJECTION INTRAVENOUS at 09:51

## 2023-05-14 RX ADMIN — HYDROCODONE BITARTRATE AND ACETAMINOPHEN 1 TABLET: 5; 325 TABLET ORAL at 03:21

## 2023-05-14 RX ADMIN — HYDROCODONE BITARTRATE AND ACETAMINOPHEN 1 TABLET: 5; 325 TABLET ORAL at 09:34

## 2023-05-14 RX ADMIN — GABAPENTIN 200 MG: 100 CAPSULE ORAL at 09:37

## 2023-05-14 RX ADMIN — SODIUM CHLORIDE, PRESERVATIVE FREE 10 ML: 5 INJECTION INTRAVENOUS at 15:59

## 2023-05-14 RX ADMIN — DILTIAZEM HYDROCHLORIDE 120 MG: 120 CAPSULE, COATED, EXTENDED RELEASE ORAL at 08:04

## 2023-05-14 RX ADMIN — GABAPENTIN 200 MG: 100 CAPSULE ORAL at 14:07

## 2023-05-14 RX ADMIN — INSULIN LISPRO 1 UNITS: 100 INJECTION, SOLUTION INTRAVENOUS; SUBCUTANEOUS at 17:18

## 2023-05-14 RX ADMIN — SOTALOL HYDROCHLORIDE 80 MG: 80 TABLET ORAL at 08:04

## 2023-05-14 RX ADMIN — ENOXAPARIN SODIUM 80 MG: 100 INJECTION SUBCUTANEOUS at 14:05

## 2023-05-14 RX ADMIN — PANTOPRAZOLE SODIUM 40 MG: 40 TABLET, DELAYED RELEASE ORAL at 14:07

## 2023-05-14 RX ADMIN — DEXAMETHASONE SODIUM PHOSPHATE 4 MG: 4 INJECTION, SOLUTION INTRAMUSCULAR; INTRAVENOUS at 03:21

## 2023-05-14 RX ADMIN — DEXAMETHASONE SODIUM PHOSPHATE 4 MG: 4 INJECTION, SOLUTION INTRAMUSCULAR; INTRAVENOUS at 16:00

## 2023-05-14 RX ADMIN — DILTIAZEM HYDROCHLORIDE 5 MG: 5 INJECTION INTRAVENOUS at 03:59

## 2023-05-14 RX ADMIN — SODIUM CHLORIDE, PRESERVATIVE FREE 10 ML: 5 INJECTION INTRAVENOUS at 22:04

## 2023-05-14 RX ADMIN — GABAPENTIN 200 MG: 100 CAPSULE ORAL at 21:49

## 2023-05-14 RX ADMIN — SOTALOL HYDROCHLORIDE 80 MG: 80 TABLET ORAL at 21:49

## 2023-05-14 RX ADMIN — Medication 12.5 MG: at 09:37

## 2023-05-14 RX ADMIN — DEXAMETHASONE SODIUM PHOSPHATE 4 MG: 4 INJECTION, SOLUTION INTRAMUSCULAR; INTRAVENOUS at 21:49

## 2023-05-14 RX ADMIN — METOPROLOL SUCCINATE 150 MG: 50 TABLET, EXTENDED RELEASE ORAL at 06:57

## 2023-05-14 RX ADMIN — ATORVASTATIN CALCIUM 40 MG: 40 TABLET, FILM COATED ORAL at 21:49

## 2023-05-14 ASSESSMENT — PAIN SCALES - GENERAL
PAINLEVEL_OUTOF10: 0
PAINLEVEL_OUTOF10: 2
PAINLEVEL_OUTOF10: 0
PAINLEVEL_OUTOF10: 0
PAINLEVEL_OUTOF10: 5
PAINLEVEL_OUTOF10: 0
PAINLEVEL_OUTOF10: 5

## 2023-05-14 ASSESSMENT — PAIN DESCRIPTION - ONSET: ONSET: ON-GOING

## 2023-05-14 ASSESSMENT — PAIN DESCRIPTION - LOCATION
LOCATION: FOOT;HIP
LOCATION: FOOT;HIP
LOCATION: HIP;FOOT

## 2023-05-14 ASSESSMENT — PAIN DESCRIPTION - DESCRIPTORS
DESCRIPTORS: ACHING
DESCRIPTORS: ACHING
DESCRIPTORS: ACHING;SHARP

## 2023-05-14 ASSESSMENT — PAIN - FUNCTIONAL ASSESSMENT: PAIN_FUNCTIONAL_ASSESSMENT: ACTIVITIES ARE NOT PREVENTED

## 2023-05-14 ASSESSMENT — PAIN DESCRIPTION - ORIENTATION
ORIENTATION: LEFT
ORIENTATION: LEFT

## 2023-05-14 ASSESSMENT — PAIN DESCRIPTION - FREQUENCY: FREQUENCY: CONTINUOUS

## 2023-05-14 ASSESSMENT — PAIN DESCRIPTION - PAIN TYPE: TYPE: CHRONIC PAIN;ACUTE PAIN

## 2023-05-14 NOTE — PROGRESS NOTES
[facility]  Office: 7900  1826, DO, Ally Quick, DO, Saniya Marino, DO, Dallas Garcia Blood, DO, Braulio Aleman MD, Federica Starr MD, Brock Benson MD, Suresh Strauss MD,  Neo Waters MD, Katie Auguste MD, Jade Burgos DO, Ezequiel Mayo MD,  Liz Crowder MD, Sherrill Cid MD, Nadine Ibarra, DO, Kiran Francisco MD, Mulu Zaidi MD, Ashlee Ramirez, DO, Quoc Childress MD, Yvrose Raza MD, Shannon Wallace MD, Ayesha Beckett MD,  Terri Rendon DO, Claudia Matson MD,  Monse Pool, CNP,  Paul Stein, CNP, Jame Gomez, CNP, Denice Cushing, CNP,  Syed Avila, DNP, Theresa Cortez, CNP, Lea Coleman, CNP, Shilpa Melo, CNP, Lizeth Linda, CNP, Muna Brown, CNP, Sherwin Escalera PA-C, Geo Tapia, CNS, Mariel Canseco, CNP, Camron Romero, [address] [facility]    Progress Note    5/14/2023    12:46 PM    Name:   Paige Mendez  MRN:     [id number]     [address]:      [de-identified]   Room:   [facility]/1007-02   Day:  3  Admit Date:  5/11/2023 10:32 AM    PCP:   Cherisse Collet, MD  Code Status:  Full Code    Subjective:       Patient still feeling weak today still have any loss of bowel or bladder function. He did convert to normal sinus rhythm yesterday. He does not have any palpitations nausea, vomiting, diarrhea. Brief History: This is a 75-year-old male who presents to the hospital for evaluation after falling. He says prior to fall patient was feeling lightheaded and dizzy, denies losing consciousness or hitting his head. He was found to be in atrial fibrillation with rapid ventricular response. He was started on Cardizem drip and his home Toprol was restarted. Echocardiogram showed: Cardiology evaluated patient and recommended:     Medications:      Allergies:  No Known Allergies    Current Meds:   Scheduled Meds:    metoprolol succinate  150 mg Oral Daily    dexamethasone  4 mg --    K 3.7 4.3  --    * 101  --    CO2 22 25  --    GLUCOSE 122* 157*  --    BUN 17 18  --    CREATININE 0.43* 0.56*  --    ANIONGAP 10 11  --    LABGLOM >60 >60  --    CALCIUM 8.2* 9.0  --    PSA  --   --  0.67       Recent Labs     05/12/23  0446 05/12/23  0807 05/12/23  0946 05/12/23  1157 05/13/23  0731 05/13/23  0928 05/13/23  1110 05/13/23  1614 05/13/23 2024 05/14/23  0742 05/14/23  1119   PROT  --   --   --   --   --  5.2*  --   --   --   --   --    LABA1C 6.1*  --   --   --   --   --   --   --   --   --   --    TSH 1.03  --   --   --   --   --   --   --   --   --   --    LDH  --   --  360*  --   --   --   --   --   --   --   --    POCGLU  --    < >  --    < > 135*  --  146* 161* 313* 177* 183*    < > = values in this interval not displayed. ABG:No results found for: POCPH, PHART, PH, POCPCO2, NQM8DMY, PCO2, POCPO2, PO2ART, PO2, POCHCO3, ROF5MRY, HCO3, NBEA, PBEA, BEART, BE, THGBART, THB, VLQ1GSN, SSUB0PYS, N0ADRUAL, O2SAT, FIO2  No results found for: SPECIAL  No results found for: CULTURE    Radiology:  XR HIP LEFT (2-3 VIEWS)    Result Date: 5/11/2023  No acute osseous or soft tissue abnormality. Degenerative change of the left SI joint and left hip joint. XR CHEST PORTABLE    Result Date: 5/11/2023  No acute cardiopulmonary process       Physical Examination:     General appearance:  alert, cooperative and no distress  Mental Status:  oriented to person, place and time and normal affect  Lungs:  clear to auscultation bilaterally, normal effort  Heart: Regular rate and regular rhythm, no murmur  Abdomen:  soft, nontender, nondistended, normal bowel sounds, no masses, hepatomegaly, splenomegaly  Extremities:  no edema, redness, tenderness in the calves 3 out of 5 muscle strength in lower extremities bilaterally with loss of sensation/numbness in left foot up to his knee. Proprioception is intact in feet. Muscle strength 5 out of 5 bilaterally in upper extremities.   Rest of cranial nerves are intact  Skin:  no gross lesions, rashes, induration    Assessment:     Hospital Problems             Last Modified POA    * (Principal) Atrial fibrillation with RVR (HCC) 5/11/2023 Yes    Chronic obstructive pulmonary disease (HCC) (Chronic) 5/11/2023 Yes    Coronary artery disease involving native coronary artery of native heart without angina pectoris (Chronic) 5/11/2023 Yes    Overview Signed 5/11/2023  4:44 PM by ANGELA Lock CNP     Formatting of this note might be different from the original.  LAD PCI in Mar 2021 & LCx & RCA were IFR'ed. Last Assessment & Plan:   Formatting of this note might be different from the original.  Was discharged on triple therapy after the PCI. What he told me he has been asked to stop his aspirin after April 4th. On statin therapy. Would recommend LDL below 70. Hyperlipidemia (Chronic) 5/11/2023 Yes    Overview Signed 5/11/2023  4:44 PM by ANGELA Lock CNP     Last Assessment & Plan:   Formatting of this note might be different from the original.  Has had hypertriglyceridemia on recent lipid panels, continue atorvastatin 10 mg daily and will follow-up with him on lab results.          Severe malnutrition ([address]) 5/12/2023 Yes    Elevated troponin level not due myocardial infarction 5/12/2023 Yes    Left foot drop 5/13/2023 Yes    Acute left-sided low back pain with left-sided sciatica 5/13/2023 Yes    Abnormal magnetic resonance imaging of lumbar spine 5/13/2023 Yes    Malignant neoplasm metastatic to bone ([address]) 5/14/2023 Yes    Malignant neoplasm metastatic to both lungs ([address]) 5/14/2023 Yes     Plan:     New metastatic carcinoma possibly of lung origin with spread to bone causing lower extremity weakness and foot drop  Initial MRI shows findings of bony metastasis along with paraspinal soft tissue extension of the tumor and severe foraminal narrowing at L5-S1 with compression of the left L5 and S1 nerve roots  CT chest abdomen pelvis showed a

## 2023-05-14 NOTE — PLAN OF CARE
Patient alert and oriented this shift. Reporting minimal pain. Patient converted back to SR after morning medications given. Plan is for MRI of spine and brain tomorrow and consult to IR for biopsy of lung and sacrum. Problem: Safety - Adult  Goal: Free from fall injury  5/14/2023 1702 by Ben Clemons RN  Outcome: Progressing  Flowsheets (Taken 5/12/2023 0800 by Kervin Rios RN)  Free From Fall Injury: Instruct family/caregiver on patient safety  5/14/2023 0447 by Truman Granda RN  Outcome: Progressing  5/14/2023 0443 by Truman Granda RN  Outcome: Progressing     Problem: Pain  Goal: Verbalizes/displays adequate comfort level or baseline comfort level  5/14/2023 1702 by Ben Clemons RN  Outcome: Progressing  Flowsheets (Taken 5/12/2023 0800 by Kervin Rios RN)  Verbalizes/displays adequate comfort level or baseline comfort level:   Encourage patient to monitor pain and request assistance   Assess pain using appropriate pain scale   Implement non-pharmacological measures as appropriate and evaluate response  Note: Patient stating very minimal pain today. 5/14/2023 0447 by Truman Granda RN  Outcome: Progressing  5/14/2023 0446 by Truman Granda RN  Outcome: Progressing     Problem: Neurosensory - Adult  Goal: Achieves stable or improved neurological status  Outcome: Progressing  Flowsheets (Taken 5/14/2023 1702)  Achieves stable or improved neurological status:   Assess for and report changes in neurological status   Maintain blood pressure and fluid volume within ordered parameters to optimize cerebral perfusion and minimize risk of hemorrhage   Monitor temperature, glucose, and sodium.  Initiate appropriate interventions as ordered     Problem: Cardiovascular - Adult  Goal: Maintains optimal cardiac output and hemodynamic stability  5/14/2023 1702 by Ben Clemons RN  Outcome: Progressing  Flowsheets (Taken 5/14/2023 1702)  Maintains optimal cardiac output and hemodynamic stability:   Monitor blood

## 2023-05-14 NOTE — PROGRESS NOTES
Pt 's-140's. /73. Remote Np notified. Order to give Toprol XL 150mg. Routine EKG also done. Results show : Critical Test Result: High HR 143BPM  Atrial fibrillation with rapid ventricular response  Low voltage QRS  Septal infarct , age undetermined  Abnormal ECG  Will continue to monitor.

## 2023-05-14 NOTE — PROGRESS NOTES
Physical Therapy  DATE: 2023    NAME: Yesica Elmore  MRN: [id number]   : 1950    Patient not seen this date for Physical Therapy due to:      [] Cancel by RN or physician due to:    [] Hemodialysis    [] Critical Lab Value Level     [] Blood transfusion in progress    [] Acute or unstable cardiovascular status   _MAP < 55 or more than >115  _HR < 40 or > 130    [] Acute or unstable pulmonary status   -FiO2 > 60%   _RR < 5 or >40    _O2 sats < 85%    [] Strict Bedrest    [] Off Unit for surgery or procedure    [] Off Unit for testing       [] Pending imaging to R/O fracture    [] Refusal by Patient      [x] Other: RN states pt is deferring therapy today d/t being afraid of going back into A-fib. Pt wants to wait until he gets the MRI.      [] PT being discontinued at this time. Patient independent. No further needs. [] PT being discontinued at this time as the patient has been transferred to hospice care. No further needs.       [employer], [place]

## 2023-05-14 NOTE — PLAN OF CARE
Pt transferred from CVICU. Pt in bed, remains free from falls. Prefers HOB elevated d/t back pain. Pt in afib. Eliquis on hold. HR in 120's-130's. Remote NP notified. 1x order for 5mg IV cardizem. HR in 90's-low 100's. Remote NP ok with it and order to continue to monitor. EKG to be done around 0600.    Problem: Cardiovascular - Adult  Goal: Maintains optimal cardiac output and hemodynamic stability  Outcome: Progressing     Problem: Pain  Goal: Verbalizes/displays adequate comfort level or baseline comfort level  5/14/2023 0447 by Maddi Irene RN  Outcome: Progressing     Problem: Safety - Adult  Goal: Free from fall injury  5/14/2023 0447 by Maddi Irene RN  Outcome: Progressing

## 2023-05-14 NOTE — PROGRESS NOTES
[facility] Neurology   IN-PATIENT SERVICE      NEUROLOGY PROGRESS  NOTE            Date:   5/14/2023  Patient name:  Yesica Elmore  Date of admission:  5/11/2023  YOB: 1950      Interval History:     Recent developments noted below from yesterday patient did have CT of the head without contrast.  No obvious mass eye disease or other lesions identified. To my eye patient does have follow-up with temporal atrophy present. Radiologist recommended MRI brain with and without contrast to further evaluate for possible Dastech disease. Has been ordered and is in process. Patient again confirms today that he is a cigar smoker. Patient is resting comfortably. He reports that since has been started on Decadron his leg has markedly improved. Marked reduction pain noted. Good improvement mobility of left leg noted. Numbness persist.    History of Present Illness: The patient is a 74 y.o. male who presents with Hip Pain (Pt states he was at [facility] clinic to get a pain injection for his back when he fell, no LOC, and has pain in Lt hip 10/10, Pt on Blood thinners. ) and Tachycardia  . The patient was seen and examined and the chart was reviewed. Brief history shows that patient has had increasing pain low back radiating to his right arm and left leg for several months. Is admitted to the hospital with left foot drop. MRI lumbosacral spine demonstrate evidence of distant disease of about L5. Abnormality in the apex of the right lung noted on chest x-ray. Patient was referred for CT chest abdomen pelvis. Findings are consistent with a spiculated mass right upper lobe, multiple lung nodules, mediastinal adenopathy. Concern for malignancy is raised. Oncology has been consulted. Neurosurgery was also consulted and is evaluating the case.     Past Medical History:     Past Medical History:   Diagnosis Date    A-fib [facility])     Anxiety 10/12/2017    Cardiomyopathy ([address]) 12/7/2020    Formatting

## 2023-05-14 NOTE — CONSULTS
Today's Date: 5/13/2023  Patient Name: Santa Cleaning  Date of admission: 5/11/2023 10:32 AM  Patient's age: 74 y. o., 1950  Admission Dx: Atrial fibrillation with RVR ([address]) [I48.91]    Reason for Consult: management recommendations  Requesting Physician: Vito Menendez MD    CHIEF COMPLAINT:  lytic bone lesion     History Obtained From:  patient    HISTORY OF PRESENT ILLNESS:      The patient is a 74 y.o.  male who is admitted to the hospital  after a fall. He was found to be in atrial fibrillation with rapid ventricular response. The patient has been complaining of worsening back pain and was seen by pain management for back and is receiving injections. Imaging study done during hospitalization showed lytic bone lesions and CT of the chest abdominal pelvis showed multiple lung nodules highly suggestive of metastatic carcinoma. The patient is seen and evaluated. He is still and denying and keeps telling me that he is feeling well and he does not have any cancer. Despite discussing the CT findings, he is still unable to comprehend the findings and kept assuring me that he is healthy and he has no problems. It seems that he has been complaining of worsening pain for many months. He was seen by chiropractor primary care physician physical therapy and then sent to pain management. He does not remember having any special imaging of his back or spine. Past Medical History:   has a past medical history of A-fib ([address]), Anxiety, Cardiomyopathy ([address]), Chronic obstructive pulmonary disease ([address]), Coronary artery disease involving native coronary artery of native heart without angina pectoris, and Hyperlipidemia. Past Surgical History:   has a past surgical history that includes Cardioversion; Colonoscopy; Cardiac catheterization; and Appendectomy. Medications:    Reviewed in Epic     Allergies:  Patient has no known allergies. Social History:   reports that he has never smoked.  He has

## 2023-05-14 NOTE — PROGRESS NOTES
Patient transferred to progressive care unit room 1007 per bed. Vitals and general assessment completed as charted. Patient oriented to room, call light, and bed controls. Bed alarm activated for patient's safety.

## 2023-05-14 NOTE — PROGRESS NOTES
Today's Date: 5/14/2023  Patient Name: Paige Mendez  Date of admission: 5/11/2023 10:32 AM  Patient's age: 74 y. o., 1950  Admission Dx: Atrial fibrillation with RVR ([address]) [I48.91]    Reason for Consult: management recommendations  Requesting Physician: Ezequiel Mayo MD    CHIEF COMPLAINT:  lytic bone lesion     INTERIM HISTORY  Patient is seen and examined  Wife at bedside. Pt is still denying all symptoms, but wife added that he was a smoker all his life, he just quit a month ago, she added that he lost about 40 lb. HISTORY OF PRESENT ILLNESS:      The patient is a 74 y.o.  male who is admitted to the hospital  after a fall. He was found to be in atrial fibrillation with rapid ventricular response. The patient has been complaining of worsening back pain and was seen by pain management for back and is receiving injections. Imaging study done during hospitalization showed lytic bone lesions and CT of the chest abdominal pelvis showed multiple lung nodules highly suggestive of metastatic carcinoma. The patient is seen and evaluated. He is still and denying and keeps telling me that he is feeling well and he does not have any cancer. Despite discussing the CT findings, he is still unable to comprehend the findings and kept assuring me that he is healthy and he has no problems. It seems that he has been complaining of worsening pain for many months. He was seen by chiropractor primary care physician physical therapy and then sent to pain management. He does not remember having any special imaging of his back or spine. Past Medical History:   has a past medical history of A-fib ([address]), Anxiety, Cardiomyopathy ([address]), Chronic obstructive pulmonary disease ([address]), Coronary artery disease involving native coronary artery of native heart without angina pectoris, and Hyperlipidemia.     Past Surgical History:   has a past surgical history that includes Cardioversion; Colonoscopy; Cardiac back pain with left-sided sciatica [M54.42] 05/13/2023    Abnormal magnetic resonance imaging of lumbar spine [R93.7] 05/13/2023    Severe malnutrition ([address]) [E43] 05/12/2023    Elevated troponin level not due myocardial infarction [R77.8] 05/12/2023    Atrial fibrillation with RVR ([address]) [I48.91] 05/11/2023    Chronic obstructive pulmonary disease ([address]) [J44.9] 03/08/2022    Coronary artery disease involving native coronary artery of native heart without angina pectoris [I25.10] 03/05/2021    Hyperlipidemia [E78.5] 10/12/2017         IMPRESSION:   Metastatic carcinoma, possibly of lung origin  Lung and bone metastases  Left foot drop  Atrial fibrillation with rapid ventricular response  Smoker     RECOMMENDATIONS:  The picture is highly suggestive of metastatic carcinoma possibly of lung origin  The patient is a lifelong smoker, raising the possibility of lung cancer. We will need tissue diagnosis so we will arrange for a biopsy , he is agreeable. Will order for tomorrow   Hold eliquis and he is now on lovenox   We will need brain imaging to exclude brain metastases, ordered   We will follow closely with you      Discussed with patient and Nurse. Thank you for asking us to see this patient.     AMANDA BETTENCOURT [facility] MD [place]  Hematologist/Medical Oncologist  Cell: [phone]

## 2023-05-15 ENCOUNTER — APPOINTMENT (OUTPATIENT)
Dept: MRI IMAGING | Age: 73
DRG: 477 | End: 2023-05-15
Payer: MEDICARE

## 2023-05-15 ENCOUNTER — APPOINTMENT (OUTPATIENT)
Dept: CT IMAGING | Age: 73
DRG: 477 | End: 2023-05-15
Payer: MEDICARE

## 2023-05-15 LAB
ALBUMIN PERCENT: 54 % (ref 45–65)
ALBUMIN SERPL-MCNC: 2.8 G/DL (ref 3.2–5.2)
ALPHA 1 PERCENT: 6 % (ref 3–6)
ALPHA 2 PERCENT: 19 % (ref 6–13)
ALPHA1 GLOB SERPL ELPH-MCNC: 0.3 G/DL (ref 0.1–0.4)
ALPHA2 GLOB SERPL ELPH-MCNC: 1 G/DL (ref 0.5–0.9)
ANTI-XA UNFRAC HEPARIN: 0.37 IU/L (ref 0.3–0.7)
B-GLOBULIN SERPL ELPH-MCNC: 0.6 G/DL (ref 0.5–1.1)
B2 MICROGLOB SERPL IA-MCNC: 1.5 MG/L (ref 0.6–2.4)
BETA PERCENT: 12 % (ref 11–19)
ERYTHROCYTE [DISTWIDTH] IN BLOOD BY AUTOMATED COUNT: 13.8 % (ref 11.8–14.4)
GAMMA GLOB SERPL ELPH-MCNC: 0.5 G/DL (ref 0.5–1.5)
GAMMA GLOBULIN %: 9 % (ref 9–20)
GLUCOSE BLD-MCNC: 162 MG/DL (ref 75–110)
GLUCOSE BLD-MCNC: 169 MG/DL (ref 75–110)
GLUCOSE BLD-MCNC: 183 MG/DL (ref 75–110)
GLUCOSE BLD-MCNC: 273 MG/DL (ref 75–110)
HCT VFR BLD AUTO: 36.1 % (ref 40.7–50.3)
HGB BLD-MCNC: 12.1 G/DL (ref 13–17)
INR PPP: 1.3
INTERPRETATION SERPL IFE-IMP: NORMAL
MCH RBC QN AUTO: 32.4 PG (ref 25.2–33.5)
MCHC RBC AUTO-ENTMCNC: 33.5 G/DL (ref 28.4–34.8)
MCV RBC AUTO: 96.5 FL (ref 82.6–102.9)
NRBC AUTOMATED: 0 PER 100 WBC
PARTIAL THROMBOPLASTIN TIME: 24.4 SEC (ref 23.9–33.8)
PATHOLOGIST: ABNORMAL
PATHOLOGIST: NORMAL
PLATELET # BLD AUTO: 205 K/UL (ref 138–453)
PMV BLD AUTO: 9.8 FL (ref 8.1–13.5)
PROT PATTERN SERPL ELPH-IMP: ABNORMAL
PROT SERPL-MCNC: 5.2 G/DL (ref 6.4–8.3)
PROTHROMBIN TIME: 16.2 SEC (ref 11.5–14.2)
RBC # BLD AUTO: 3.74 M/UL (ref 4.21–5.77)
SURGICAL PATHOLOGY REPORT: NORMAL
TOTAL PROT. SUM,%: 100 % (ref 98–102)
TOTAL PROT. SUM: 5.2 G/DL (ref 6.3–8.2)
WBC OTHER # BLD: 17.8 K/UL (ref 3.5–11.3)

## 2023-05-15 PROCEDURE — 72156 MRI NECK SPINE W/O & W/DYE: CPT

## 2023-05-15 PROCEDURE — 6370000000 HC RX 637 (ALT 250 FOR IP): Performed by: NURSE PRACTITIONER

## 2023-05-15 PROCEDURE — 2580000003 HC RX 258: Performed by: NURSE PRACTITIONER

## 2023-05-15 PROCEDURE — 85730 THROMBOPLASTIN TIME PARTIAL: CPT

## 2023-05-15 PROCEDURE — 97116 GAIT TRAINING THERAPY: CPT

## 2023-05-15 PROCEDURE — 88334 PATH CONSLTJ SURG CYTO XM EA: CPT

## 2023-05-15 PROCEDURE — 82947 ASSAY GLUCOSE BLOOD QUANT: CPT

## 2023-05-15 PROCEDURE — 97530 THERAPEUTIC ACTIVITIES: CPT

## 2023-05-15 PROCEDURE — 88342 IMHCHEM/IMCYTCHM 1ST ANTB: CPT

## 2023-05-15 PROCEDURE — 93005 ELECTROCARDIOGRAM TRACING: CPT | Performed by: INTERNAL MEDICINE

## 2023-05-15 PROCEDURE — 97535 SELF CARE MNGMENT TRAINING: CPT

## 2023-05-15 PROCEDURE — 99232 SBSQ HOSP IP/OBS MODERATE 35: CPT | Performed by: INTERNAL MEDICINE

## 2023-05-15 PROCEDURE — 6370000000 HC RX 637 (ALT 250 FOR IP): Performed by: INTERNAL MEDICINE

## 2023-05-15 PROCEDURE — 85027 COMPLETE CBC AUTOMATED: CPT

## 2023-05-15 PROCEDURE — 72149 MRI LUMBAR SPINE W/DYE: CPT

## 2023-05-15 PROCEDURE — 72157 MRI CHEST SPINE W/O & W/DYE: CPT

## 2023-05-15 PROCEDURE — 36415 COLL VENOUS BLD VENIPUNCTURE: CPT

## 2023-05-15 PROCEDURE — 2709999900 CT BIOPSY DEEP BONE PERCUTANEOUS

## 2023-05-15 PROCEDURE — 6360000002 HC RX W HCPCS: Performed by: INTERNAL MEDICINE

## 2023-05-15 PROCEDURE — 70553 MRI BRAIN STEM W/O & W/DYE: CPT

## 2023-05-15 PROCEDURE — 85520 HEPARIN ASSAY: CPT

## 2023-05-15 PROCEDURE — 85610 PROTHROMBIN TIME: CPT

## 2023-05-15 PROCEDURE — 88305 TISSUE EXAM BY PATHOLOGIST: CPT

## 2023-05-15 PROCEDURE — 6360000002 HC RX W HCPCS: Performed by: RADIOLOGY

## 2023-05-15 PROCEDURE — 2060000000 HC ICU INTERMEDIATE R&B

## 2023-05-15 PROCEDURE — 0QB13ZX EXCISION OF SACRUM, PERCUTANEOUS APPROACH, DIAGNOSTIC: ICD-10-PCS | Performed by: RADIOLOGY

## 2023-05-15 PROCEDURE — 77012 CT SCAN FOR NEEDLE BIOPSY: CPT

## 2023-05-15 PROCEDURE — A9579 GAD-BASE MR CONTRAST NOS,1ML: HCPCS | Performed by: INTERNAL MEDICINE

## 2023-05-15 PROCEDURE — 99231 SBSQ HOSP IP/OBS SF/LOW 25: CPT | Performed by: PSYCHIATRY & NEUROLOGY

## 2023-05-15 PROCEDURE — 88333 PATH CONSLTJ SURG CYTO XM 1: CPT

## 2023-05-15 PROCEDURE — 88341 IMHCHEM/IMCYTCHM EA ADD ANTB: CPT

## 2023-05-15 PROCEDURE — 6360000004 HC RX CONTRAST MEDICATION: Performed by: INTERNAL MEDICINE

## 2023-05-15 RX ORDER — DEXAMETHASONE SODIUM PHOSPHATE 4 MG/ML
4 INJECTION, SOLUTION INTRA-ARTICULAR; INTRALESIONAL; INTRAMUSCULAR; INTRAVENOUS; SOFT TISSUE EVERY 12 HOURS
Status: DISCONTINUED | OUTPATIENT
Start: 2023-05-15 | End: 2023-05-17 | Stop reason: HOSPADM

## 2023-05-15 RX ORDER — HEPARIN SODIUM 10000 [USP'U]/100ML
5-30 INJECTION, SOLUTION INTRAVENOUS CONTINUOUS
Status: DISCONTINUED | OUTPATIENT
Start: 2023-05-16 | End: 2023-05-16

## 2023-05-15 RX ORDER — HEPARIN SODIUM 1000 [USP'U]/ML
60 INJECTION, SOLUTION INTRAVENOUS; SUBCUTANEOUS PRN
Status: DISCONTINUED | OUTPATIENT
Start: 2023-05-15 | End: 2023-05-15

## 2023-05-15 RX ORDER — HEPARIN SODIUM 10000 [USP'U]/100ML
5-30 INJECTION, SOLUTION INTRAVENOUS CONTINUOUS
Status: DISCONTINUED | OUTPATIENT
Start: 2023-05-15 | End: 2023-05-15

## 2023-05-15 RX ORDER — HEPARIN SODIUM 1000 [USP'U]/ML
30 INJECTION, SOLUTION INTRAVENOUS; SUBCUTANEOUS PRN
Status: DISCONTINUED | OUTPATIENT
Start: 2023-05-15 | End: 2023-05-15

## 2023-05-15 RX ORDER — FENTANYL CITRATE 0.05 MG/ML
INJECTION, SOLUTION INTRAMUSCULAR; INTRAVENOUS
Status: COMPLETED | OUTPATIENT
Start: 2023-05-15 | End: 2023-05-15

## 2023-05-15 RX ADMIN — INSULIN HUMAN 10 UNITS: 100 INJECTION, SUSPENSION SUBCUTANEOUS at 16:47

## 2023-05-15 RX ADMIN — HYDROCODONE BITARTRATE AND ACETAMINOPHEN 2 TABLET: 5; 325 TABLET ORAL at 08:58

## 2023-05-15 RX ADMIN — GABAPENTIN 200 MG: 100 CAPSULE ORAL at 12:04

## 2023-05-15 RX ADMIN — FENTANYL CITRATE 50 MCG: 0.05 INJECTION, SOLUTION INTRAMUSCULAR; INTRAVENOUS at 10:45

## 2023-05-15 RX ADMIN — DEXAMETHASONE SODIUM PHOSPHATE 4 MG: 4 INJECTION, SOLUTION INTRAMUSCULAR; INTRAVENOUS at 04:21

## 2023-05-15 RX ADMIN — SOTALOL HYDROCHLORIDE 80 MG: 80 TABLET ORAL at 12:03

## 2023-05-15 RX ADMIN — HYDROCODONE BITARTRATE AND ACETAMINOPHEN 1 TABLET: 5; 325 TABLET ORAL at 20:58

## 2023-05-15 RX ADMIN — SOTALOL HYDROCHLORIDE 80 MG: 80 TABLET ORAL at 20:55

## 2023-05-15 RX ADMIN — GADOTERIDOL 14 ML: 279.3 INJECTION, SOLUTION INTRAVENOUS at 18:06

## 2023-05-15 RX ADMIN — DEXAMETHASONE SODIUM PHOSPHATE 4 MG: 4 INJECTION, SOLUTION INTRAMUSCULAR; INTRAVENOUS at 16:46

## 2023-05-15 RX ADMIN — DILTIAZEM HYDROCHLORIDE 120 MG: 120 CAPSULE, COATED, EXTENDED RELEASE ORAL at 12:03

## 2023-05-15 RX ADMIN — SODIUM CHLORIDE, PRESERVATIVE FREE 10 ML: 5 INJECTION INTRAVENOUS at 12:07

## 2023-05-15 RX ADMIN — GABAPENTIN 200 MG: 100 CAPSULE ORAL at 20:55

## 2023-05-15 RX ADMIN — PANTOPRAZOLE SODIUM 40 MG: 40 TABLET, DELAYED RELEASE ORAL at 05:41

## 2023-05-15 RX ADMIN — ATORVASTATIN CALCIUM 40 MG: 40 TABLET, FILM COATED ORAL at 20:55

## 2023-05-15 RX ADMIN — METOPROLOL SUCCINATE 150 MG: 50 TABLET, EXTENDED RELEASE ORAL at 12:03

## 2023-05-15 ASSESSMENT — PAIN SCALES - GENERAL
PAINLEVEL_OUTOF10: 7
PAINLEVEL_OUTOF10: 3

## 2023-05-15 ASSESSMENT — PAIN DESCRIPTION - ORIENTATION: ORIENTATION: LEFT;RIGHT

## 2023-05-15 ASSESSMENT — PAIN DESCRIPTION - LOCATION
LOCATION: KNEE
LOCATION: BACK

## 2023-05-15 NOTE — PROGRESS NOTES
Occupational Therapy  Facility/Department: [facility]  Rehabilitation Occupational Therapy Daily Treatment Note    Date: 5/15/23  Patient Name: Demarco Mckinney       Room: 9159/9619-18  MRN: [id number]  Account: [de-identified]   : 1950  (74 y.o.) Gender: male      JOSE MIGUEL Flores reports patient is medically stable for therapy treatment this date. Chart reviewed prior to treatment and patient is agreeable for therapy. All lines intact and patient positioned comfortably at end of treatment. All patient needs addressed prior to ending therapy session. Pt is currently functional below baseline and recommend comprehensive and intensive skilled therapy by a multidisciplinary team. Would expect patient to be able to tolerate 3 hours of therapy per day and able to tolerate at least one hour up in chair. Please refer to AM-PAC score for current mobility/adl level. Past Medical History:  has a past medical history of A-fib ([address] Anxiety, Cardiomyopathy ([address] Chronic obstructive pulmonary disease ([address] Coronary artery disease involving native coronary artery of native heart without angina pectoris, and Hyperlipidemia. Past Surgical History:   has a past surgical history that includes Cardioversion; Colonoscopy; Cardiac catheterization; and Appendectomy. Restrictions  Restrictions/Precautions: Fall Risk, General Precautions, Bed Alarm  Other position/activity restrictions: Activity as tolerated, Up w/ assist, maintain heels off bed, telemetry, RUE IV  Required Braces or Orthoses?: No    Subjective  Subjective: Pt resting in bed upon arrival agreeable to therapy after Mod encouragement. C/o pain in L hip and back. Restrictions/Precautions: Fall Risk;General Precautions; Bed Alarm             Objective     Cognition  Overall Cognitive Status: WFL  Arousal/Alertness: Appropriate responses to stimuli  Following Commands:  Follows all commands without difficulty  Attention Span: Appears Recommendations: Strengthening;Balance training;Functional mobility training; Endurance training; Safety education & training;Pain management; Neuromuscular re-education;Patient/Caregiver education & training;Self-Care / ADL;Equipment evaluation, education, & procurement; Coordination training    Goals  Patient Goals   Patient goals : To have less pain! Short Term Goals  Time Frame for Short Term Goals: By discharge, pt to demo:  Short Term Goal 1: bed mobility tasks to SBA with Good safety and use of bedrails as needed. Short Term Goal 2: ADL transfers and functional mobility tasks to MinAx1 with Good safety and use of AD as needed. Short Term Goal 3: UB ADLs to set up assist and LB ADLs to ModAx1 with Good safety and use of AD/AE as needed. Short Term Goal 4: toileting tasks to MinAx1 with Good safety and use of AD/grab bars/BSC as needed. Short Term Goal 5: IND with a BUE HEP, with use of handouts, to promote strength required for increased safety & participation in ADLs. Long Term Goals  Long Term Goal 1: Pt to demo increased standing tolerance to > 8 mins to promote functional activity tolerance and to increase participation in LB ADL tasks. Long Term Goal 2: Pt to be IND with fall prevention strategies, EC/WS tech, compensatory ADL strategies, and equipment/discharge recommendations, with use of handouts as needed. AM-PAC Score        -[facility] Inpatient Daily Activity Raw Score: 14 (05/15/23 1327)  AM-PAC Inpatient ADL T-Scale Score : 33.39 (05/15/23 1327)  ADL Inpatient CMS 0-100% Score: 59.67 (05/15/23 1327)  ADL Inpatient CMS G-Code Modifier : CK (05/15/23 1327)      Therapy Time   Individual Concurrent Group Co-treatment   Time In 6269         Time Out 1314         Minutes 44               Co-treatment with PT warranted secondary to decreased safety and independence requiring 2 skilled therapy professionals to address individual discipline's goals.  OT addressing preparation for ADL transfer, sitting balance for increased ADL performance, sitting/activity tolerance, functional reaching, environmental safety/scanning, fall prevention, functional mobility for ADL transfers, ability to sequence and follow directions, bed mobility tech, and functional UE strength.      DALE Brown

## 2023-05-15 NOTE — BRIEF OP NOTE
Brief Postoperative Note    Ines Hidalgo  YOB: 1950  [id number]    Pre-operative Diagnosis: Lung and bone lesions suspicious for metastases. Post-operative Diagnosis: Same    Procedure: Sacral biopsy    Medications Given: fentanyl    Anesthesia: Local    Surgeons/Assistants: Anthony Velasco MD    Estimated Blood Loss: minimal    Complications: none    Specimens: were obtained    Findings: Two 15 g core biopsies left sacrum obtained under CT fluoro guidance. Specimen adequacy confirmed by pathology onsite. Pt tolerated well.       Electronically signed by Anthony Velasco MD on 5/15/2023 at 11:28 AM

## 2023-05-15 NOTE — PROGRESS NOTES
[facility] Neurology   IN-PATIENT SERVICE      NEUROLOGY PROGRESS  NOTE                Interval History:     Patient just back from MRI and also biopsy. Continues to have weakness in his left lower extremity although pain is controlled. He will have remaining MRI with contrast done later this afternoon. History of Present Illness: The patient is a 74 y.o. male who presents with Hip Pain (Pt states he was at [facility] clinic to get a pain injection for his back when he fell, no LOC, and has pain in Lt hip 10/10, Pt on Blood thinners. ) and Tachycardia  . The patient was seen and examined and the chart was reviewed. Patient has had pain radiating down the left side of his leg and weakness as far as left foot drop for the last several months. MRI of the lumbar spine show evidence of metastatic disease around L5. Also abnormality in right lung apex with spiculated mass concerning for lung malignancy. Physical Exam:   /70   Pulse 74   Temp 97.9 °F (36.6 °C)   Resp 18   Ht 5' 10\" (1.778 m)   Wt 161 lb 11.2 oz (73.3 kg)   SpO2 95%   BMI 23.20 kg/m²   Temp (24hrs), Av.1 °F (36.7 °C), Min:97.5 °F (36.4 °C), Max:98.6 °F (37 °C)      Neurological examination:    Mental status   Alert and oriented x 3; following all commands; speech is fluent. Cranial nerves   CN II - XII intact   Motor function  Strength: 5/5 RUE, 5/5 RLE, 5/5 LUE, 4/5 left iliopsoas, quadriceps. 3/5 left hamstrings. 1/5 dorsiflexion. 1/5 plantarflexion. Sensory function Intact to touch throughout     Cerebellar Intact finger-nose-finger testing. Intact heel-shin testing. Reflex function 1/4L LE patellar and Achilles.      Gait                  Not assessed           Diagnostics:      Laboratory Testing:  CBC:   Recent Labs     23   WBC 13.3*   HGB 12.5*        BMP:    Recent Labs     23      K 4.3      CO2 25   BUN 18   CREATININE 0.56*   GLUCOSE 157*

## 2023-05-15 NOTE — CARE COORDINATION
Social Work- met with pt and wife. They would like a referral to [facility].  Sent referral. Alix Moe

## 2023-05-15 NOTE — DISCHARGE INSTR - COC
Continuity of Care Form    Patient Name: Rukhsana Restrepo   :  1950  MRN:  [id number]    Admit date:  2023  Discharge date:  ***    Code Status Order: Full Code   Advance Directives:     Admitting Physician:  Fredrick Cardenas MD  PCP: Kesha Kline MD    Discharging Nurse: [facility] Unit/Room#: 1007/1007-02  Discharging Unit Phone Number: ***    Emergency Contact:   Extended Emergency Contact Information  Primary Emergency Contact: [address] Phone: [phone]  Relation: Spouse  Preferred language: English   needed?  No  Secondary Emergency Contact: Moe Parker Phone: [phone]  Relation: Child    Past Surgical History:  Past Surgical History:   Procedure Laterality Date    APPENDECTOMY      CARDIAC CATHETERIZATION      CARDIOVERSION      COLONOSCOPY      CT BIOPSY PERCUTANEOUS DEEP BONE  5/15/2023    CT BIOPSY PERCUTANEOUS DEEP BONE 5/15/2023 [facility] CT SCAN       Immunization History:   Immunization History   Administered Date(s) Administered    COVID-19, PFIZER Bivalent, DO NOT Dilute, (age 12y+), IM, 27 mcg/0.3 mL 2022       Active Problems:  Patient Active Problem List   Diagnosis Code    Atrial fibrillation with RVR ([facility]) I48.91    Anxiety F41.9    Cardiomyopathy [address] I42.9    Chronic obstructive pulmonary disease ([facility]) J44.9    Coronary artery disease involving native coronary artery of native heart without angina pectoris I25.10    Essential hypertension I10    Hyperlipidemia E78.5    Palpitations R00.2    Severe malnutrition (HCC) E43    Elevated troponin level not due myocardial infarction R77.8    Left foot drop M21.372    Acute left-sided low back pain with left-sided sciatica M54.42    Abnormal magnetic resonance imaging of lumbar spine R93.7    Malignant neoplasm metastatic to bone (HCC) C79.51    Malignant neoplasm metastatic to both lungs (HCC) C78.01, C78.02    Left lumbar radiculopathy M54.16    Weakness of both lower extremities

## 2023-05-15 NOTE — PROGRESS NOTES
[facility]  Office: [id number]  1826, DO, Levi Pedro, DO, Lisa Herrera, DO, Mansi Adame Blood, DO, Fritz Monge MD, Prosper Cao MD, Cheyanne Ordaz MD, Mio Tillman MD,  Carlos Gonzales MD, Willa Hobbs MD, Shirlene Gamino, DO, Nicholas Cabrera MD,  Viviana Mairon MD, Phuong Murillo MD, Zhang Cabrera, DO, Niki Belcher MD, Renny Allison MD, Maria Isabel Sinclair DO, Suraj Wiseman MD, Brandie Lunsford MD, Gianluca Esqueda MD, Suzanne Navarro MD,  Berkley Martinez, DO, Lennox Yu MD,  Efrain Briggs, ROSSY,  Conner Linda, CNP, Katherine Tsang, CNP, Tye Alva, CNP,  Maryuri Rubio, [facility], Jeremy London, CNP, Carmen Tobin, CNP, Denisse Bartholomew, CNP, Sarthak Leon, CNP, Cecy Joy, CNP, Little Lin PA-C, Rose Lo, CNS, Mason Lundberg, CNP, Cassie Farah, [address] [place] [address]    Progress Note    5/15/2023    10:53 AM    Name:   Cassie Meier  MRN:     [id number]     [address]:      [de-identified]   Room:   1007/1007-02  IP Day:  4  Admit Date:  5/11/2023 10:32 AM    PCP:   Jonna Kelley MD  Code Status:  Full Code    Subjective:       Patient said he did not sleep well last night, had some confusion per nurse. This morning he is awake, alert, oriented and has no complaints. He feels like his strength is improving in his lower extremities. Brief History: This is a 66-year-old male who presents to the hospital for evaluation after falling. He says prior to fall patient was feeling lightheaded and dizzy, denies losing consciousness or hitting his head. He was found to be in atrial fibrillation with rapid ventricular response. He was started on Cardizem drip and his home Toprol was restarted. Echocardiogram showed: Cardiology evaluated patient and recommended:     Medications:      Allergies:  No Known Allergies    Current Meds:   Scheduled Meds:    insulin NPH  10 Units SubCUTAneous BID AC PSA  --  0.67     Recent Labs     05/13/23  0928 05/13/23  1110 05/13/23 2024 05/14/23  0742 05/14/23  1119 05/14/23  1614 05/14/23  2017 05/15/23  0833   PROT 5.2*  --   --   --   --   --   --   --    POCGLU  --    < > 313* 177* 183* 243* 278* 169*    < > = values in this interval not displayed. ABG:No results found for: POCPH, PHART, PH, POCPCO2, DQC3ZFZ, PCO2, POCPO2, PO2ART, PO2, POCHCO3, UUQ3FQL, HCO3, NBEA, PBEA, BEART, BE, THGBART, THB, CJD3SGE, ELLZ0LFA, H7RDKYWO, O2SAT, FIO2  No results found for: SPECIAL  No results found for: CULTURE    Radiology:  XR HIP LEFT (2-3 VIEWS)    Result Date: 5/11/2023  No acute osseous or soft tissue abnormality. Degenerative change of the left SI joint and left hip joint. XR CHEST PORTABLE    Result Date: 5/11/2023  No acute cardiopulmonary process       Physical Examination:     General appearance:  alert, cooperative and no distress  Mental Status:  oriented to person, place and time and normal affect  Lungs:  clear to auscultation bilaterally, normal effort  Heart: Regular rate and regular rhythm, no murmur  Abdomen:  soft, nontender, nondistended, normal bowel sounds, no masses, hepatomegaly, splenomegaly  Extremities:  no edema, redness, tenderness in the calves 4 out of 5 muscle strength in lower extremities bilaterally with loss of sensation/numbness in left foot up to his knee. Proprioception is intact in feet. Muscle strength 5 out of 5 bilaterally in upper extremities.   Rest of cranial nerves are intact  Skin:  no gross lesions, rashes, induration    Assessment:     Hospital Problems             Last Modified POA    * (Principal) Compression of lumbar nerve root 5/15/2023 Yes    Atrial fibrillation with RVR ([address] 5/15/2023 Yes    Chronic obstructive pulmonary disease ([address] (Chronic) 5/11/2023 Yes    Coronary artery disease involving native coronary artery of native heart without angina pectoris (Chronic) 5/11/2023 Yes    Overview Signed 5/11/2023  4:44 nondisplaced fractures of the transverse process. I did reach out to access center for neurosurgery consultation at [facility]. [facility]-Dr. Addison recommended MRI cervical, thoracic spine with contrast.  Continue steroids for now and gabapentin. Follow up with NS after MRI results. Will probably need radiation therapy. Oncology following, plan for biopsy lung and bone marrow for pathology  Check MRI brain to look for any metastatic disease  Atrial fibrillation with RVR- now in NSR  Echocardiogram pending. Cardizem 120 mg daily, metoprolol 150 mg daily, started on sotalol with cardiology. Restart anticoagulation after bone marrow biopsy.    Hyperglycemia likely due to steroid use  Add NPH to cover steroid effect, monitor for hypoglycemia   Hgb A1c was 6.1c  Thrombocytopenia   Resolved  History of CAD with EBER to LAD in 3/2021 has residual nonobstructive lesions in RCA and CX  Reviewed in care everywhere note from his cardiologist  Continue on Plavix, Lipitor   COPD without exacerbation   Continue current treatment  Dyslipidemia : statin      Medical Decision Making: Medium    Discussed with family   Princess Morgan DO  5/15/2023  10:53 AM

## 2023-05-15 NOTE — PROGRESS NOTES
Patient to MRI for first portion of imaging. Patient will go to IR for biopsy from MRI.  MRI will be back about 1500 for second part of imaging with contrast.

## 2023-05-15 NOTE — CARE COORDINATION
Discharge Planning    Met with pt and his wife to discuss physical therapy's recommendation for inpt rehab. Discussed with them the differences between SNF and rehab. Wife prefers rehab and offered list but she did not want to review the list. She requests referral to [facility] rehab as it is closer to their home. Explained Medicare benefits for rehab.   Pt agreeable and GUERO Goldberg informed and will initiate referral.

## 2023-05-15 NOTE — CARE COORDINATION
[facility] can admit pt , but he can not receive PET scan , radiation, or chemo while at [facility].  Abram Moraes

## 2023-05-15 NOTE — PROGRESS NOTES
Physical Therapy  Facility/Department: IR PROGRESSIVE CARE  Daily Treatment Note  NAME: Ines Hidalgo  : 1950  MRN: [id number]    Date of Service: 5/15/2023    Discharge Recommendations:  Patient would benefit from continued therapy after discharge   Pt is currently functional below baseline and recommend comprehensive and intensive skilled therapy by a multidisciplinary team. Would expect patient to be able to tolerate 3 hours of therapy per day and able to tolerate at least one hour up in chair. Please refer to AM-PAC score for current mobility/adl level. Patient Diagnosis(es): The encounter diagnosis was Atrial fibrillation with RVR ([facility] [address] Assessment   Assessment: Patient slowly progressing toward STGs and was able to increased gait distance today, patient limited by fatigued, L foot drop, LLE buckling, and significant increased in pain of LT hip. Therefore unable to stay up in chair to eat lunch and assisted back to bed with Mod x 2 A. Patient would benefit from continued skilled PT services. Activity Tolerance: Patient limited by pain; Patient limited by fatigue;Patient limited by endurance   AM-PAC Score: 9    Plan    Physcial Therapy Plan  General Plan: 5-7 times per week  Current Treatment Recommendations: Strengthening;ROM;Balance training;Functional mobility training;Transfer training; Endurance training;Gait training;Neuromuscular re-education;Patient/Caregiver education & training; Safety education & training;Home exercise program;Equipment evaluation, education, & procurement; Therapeutic activities     Restrictions  Restrictions/Precautions  Restrictions/Precautions: Fall Risk, General Precautions, Bed Alarm  Required Braces or Orthoses?: No  Position Activity Restriction  Other position/activity restrictions:  Activity as tolerated, Up w/ assist, maintain heels off bed, telemetry, RUE IV     Subjective    Subjective  Subjective: RN reported patient had a sacral biopsy and CT with contrast, Training  Gait Training: Yes  Gait  Overall Level of Assistance: Moderate assistance;Assist X2 (Noted Lt LE weakness and foot drop. Increased step height to account for foot drop, advancing LT LE first with large step but not advancing RW enough. Cues and assist with RW mgmt with cues for step length.)  Interventions: Safety awareness training; Tactile cues; Verbal cues  Gait Abnormalities: Foot drop; Antalgic (Decreased endurance, shakey d/t fatigued and increased pain in Lt hip)  Distance (ft):  (8' x 2)  Assistive Device: Gait belt;Walker, rolling    Neuromuscular Education  Neuromuscular Education: Yes  Treatment: Gait ;Sitting; Lower extremity;Standing  Neuromuscular Comments: VCs req for proper breathing kiley (pursed lip breathing) during functional mobility. Tactile and VCs req for postural control during sit<>stands & amb to promote abdominal and erector spinae mm facilitation for increased stability and balance, decreasing kyphosis of the spine. Pt req VCs to correct for forward WS with squatting in addition to pressing firmly into ground with feet, to promote the appropriate body mechanics for sit<>stand transfers. PT Exercises  A/AROM Exercises: Patient unable to tolerate exercises d/t increased hip pain and lunch arriving. Safety Devices  Type of Devices: All fall risk precautions in place; Bed alarm in place;Call light within reach;Gait belt;Patient at risk for falls; Left in bed;Nurse notified       Goals  Short Term Goals  Time Frame for Short Term Goals: 12 visits  Short Term Goal 1: Patient will be CGA for bed mobility. Short Term Goal 2: Patient will transfers with min assist.  Short Term Goal 3: Patient will amb 10 feet with RW and mon assist.  Short Term Goal 4: Patient will demo 4-/5 strength throughout BLE. Short Term Goal 5: Patient will tolerate 30 minutes of ther-ex and ther-act.   Patient Goals   Patient Goals : Pain control, improve walking    Education  Patient Education  Education

## 2023-05-15 NOTE — PLAN OF CARE
Patient alert, woke up x2 during the night thinking he missed his MRI. Patient easily redirected. Patient used urinal without difficulty, urine noted dark domenica yellow with foul odor. Patient call light within reach. Patient  strongly encouraged to participate in PT/OT today. Patient NPO for MRI today. Patient requested jewelry be put up. Nurse placed all bracelets and ring in nurse  in patient room. Problem: Discharge Planning  Goal: Discharge to home or other facility with appropriate resources  Outcome: Progressing  Flowsheets (Taken 5/14/2023 2000)  Discharge to home or other facility with appropriate resources: Identify barriers to discharge with patient and caregiver     Problem: Safety - Adult  Goal: Free from fall injury  5/15/2023 0349 by Rosi Kline RN  Outcome: Progressing     Problem: Skin/Tissue Integrity  Goal: Absence of new skin breakdown  Description: 1. Monitor for areas of redness and/or skin breakdown  2. Assess vascular access sites hourly  3. Every 4-6 hours minimum:  Change oxygen saturation probe site  4. Every 4-6 hours:  If on nasal continuous positive airway pressure, respiratory therapy assess nares and determine need for appliance change or resting period.   Outcome: Progressing     Problem: ABCDS Injury Assessment  Goal: Absence of physical injury  Outcome: Progressing     Problem: Nutrition Deficit:  Goal: Optimize nutritional status  Outcome: Progressing

## 2023-05-15 NOTE — PROGRESS NOTES
Nutrition Assessment     Type and Reason for Visit: Reassess    Nutrition Recommendations/Plan:   Provide MAYNOR low fat low cholesterol diet as ordered   Monitor labs as they become available   Monitor skin integrity   Monitor weights as ordered      Malnutrition Assessment:  Malnutrition Status: Severe malnutrition    Nutrition Assessment:  Patient had a biopsy today he was NPO at midnight for this, Had one MRI today and is schedule for another one, ate his pudding and crackers and is eagerly awating his lunch as he states he is hungry, The spouse stated 40# weight loss in 2 months related to possible cancer, Physical assessment reveals muscle loss at hand and some occipital loss, admits to poor appttie at times also. Ensure plus is on trays BID provides 700 kcals and 26 grams of protein if both are 100% consumed. Estimated Daily Nutrient Needs:  Energy (kcal):  6676-9611 kcals per day using 25-30 g/kg of actual body weight Weight Used for Energy Requirements: Current     Protein (g):  91-99 grams per day using 1.2-1.3 g/kg of actual body weight Weight Used for Protein Requirements: Current        Fluid (ml/day):  7057-9138 ml per day Method Used for Fluid Requirements: 1 ml/kcal    Nutrition Related Findings:   severe malnutrition, last BM 5/15 active bowel sounds, Wound Type: None    Current Nutrition Therapies:    ADULT DIET; Regular; Low Fat/Low Chol/High Fiber/2 gm Na;  No Added Salt (3-4 gm)  ADULT ORAL NUTRITION SUPPLEMENT; Breakfast, Dinner; Standard High Calorie/High Protein Oral Supplement    Anthropometric Measures:  Height: 5' 10\" (177.8 cm)  Current Body Wt: 161 lb (73 kg)   BMI: 23.1    Nutrition Diagnosis:   Severe malnutrition related to acute injury/trauma, early satiety as evidenced by poor intake prior to admission, intake 51-75%    Nutrition Interventions:   Food and/or Nutrient Delivery: Continue Current Diet, Continue Oral Nutrition Supplement  Nutrition Education/Counseling: No recommendation at this time  Coordination of Nutrition Care: No recommendation at this time  Plan of Care discussed with: Patient/family    Goals:  Previous Goal Met: Progressing toward Goal(s)  Goals: PO intake 75% or greater       Nutrition Monitoring and Evaluation:   Behavioral-Environmental Outcomes: None Identified  Food/Nutrient Intake Outcomes: Food and Nutrient Intake  Physical Signs/Symptoms Outcomes: Biochemical Data, Fluid Status or Edema, Hemodynamic Status, Weight, Skin, Chewing or Swallowing    Discharge Planning:    Continue Oral Nutrition Supplement, Continue current diet     Orlin Ramirez RD  Contact: [phone]

## 2023-05-15 NOTE — PROGRESS NOTES
Today's Date: 5/15/2023  Patient Name: Manpreet Washburn  Date of admission: 5/11/2023 10:32 AM  Patient's age: 74 y. o., 1950  Admission Dx: Atrial fibrillation with RVR ([address]) [I48.91]    Reason for Consult: management recommendations  Requesting Physician: Shira Groves MD    CHIEF COMPLAINT:  lytic bone lesion     INTERIM HISTORY  Patient is seen and examined  Wife at bedside. Biopsy was done today of the sacrum. Preliminary suggest infiltrating carcinoma but we are waiting for for the final diagnosis. The patient is a slightly confused, states that he feels well but his wife states that she cannot take care of him at home and she is requesting placement. HISTORY OF PRESENT ILLNESS:      The patient is a 74 y.o.  male who is admitted to the hospital  after a fall. He was found to be in atrial fibrillation with rapid ventricular response. The patient has been complaining of worsening back pain and was seen by pain management for back and is receiving injections. Imaging study done during hospitalization showed lytic bone lesions and CT of the chest abdominal pelvis showed multiple lung nodules highly suggestive of metastatic carcinoma. The patient is seen and evaluated. He is still and denying and keeps telling me that he is feeling well and he does not have any cancer. Despite discussing the CT findings, he is still unable to comprehend the findings and kept assuring me that he is healthy and he has no problems. It seems that he has been complaining of worsening pain for many months. He was seen by chiropractor primary care physician physical therapy and then sent to pain management. He does not remember having any special imaging of his back or spine.     Past Medical History:   has a past medical history of A-fib ([address]), Anxiety, Cardiomyopathy ([address]), Chronic obstructive pulmonary disease ([address]), Coronary artery disease involving native coronary artery of native heart without Temp 97.9 °F (36.6 °C) (Oral)   Resp 18   Ht 5' 10\" (1.778 m)   Wt 161 lb 11.2 oz (73.3 kg)   SpO2 95%   BMI 23.20 kg/m²    Temp (24hrs), Av.1 °F (36.7 °C), Min:97.5 °F (36.4 °C), Max:98.6 °F (37 °C)    General appearance - well appearing, no in pain or distress   Mental status - alert and cooperative   Eyes - pupils equal and reactive, extraocular eye movements intact   Ears - bilateral TM's and external ear canals normal   Mouth - mucous membranes moist, pharynx normal without lesions   Neck - supple, no significant adenopathy   Lymphatics - no palpable lymphadenopathy, no hepatosplenomegaly   Chest - clear to auscultation, no wheezes, rales or rhonchi, symmetric air entry   Heart -tachycardia that is irregular, monitor continues to show atrial fibrillation. Abdomen - soft, nontender, nondistended, no masses or organomegaly   Neurological - alert, oriented, normal speech he definitely has weakness in his left foot where he has problem with dorsiflexion and plantarflexion. Musculoskeletal - no joint tenderness, deformity or swelling   Extremities - peripheral pulses normal, no pedal edema, no clubbing or cyanosis   Skin - normal coloration and turgor, no rashes, no suspicious skin lesions noted ,    DATA:    Labs:   CBC:   Recent Labs     23  0328   WBC 13.3*   HGB 12.5*   HCT 36.9*        BMP:   Recent Labs     23  0328      K 4.3   CO2 25   BUN 18   CREATININE 0.56*   LABGLOM >60   GLUCOSE 157*     PT/INR:   No results for input(s): PROTIME, INR in the last 72 hours. IMAGING DATA:  CT scan chest abdomen pelvis  IMPRESSION:  CT CHEST:     1. Spiculated right apical mass-like consolidation is concerning for  neoplastic process. Consider further evaluation with tissue sampling. 2. Innumerable scattered pulmonary nodules bilaterally. Findings may  represent metastasis or an infectious/inflammatory process.   3. Mediastinal lymphadenopathy, nonspecific and possibly

## 2023-05-16 ENCOUNTER — HOSPITAL ENCOUNTER (OUTPATIENT)
Dept: RADIATION ONCOLOGY | Age: 73
Discharge: HOME | End: 2023-05-16

## 2023-05-16 PROBLEM — I63.9 EMBOLIC STROKE (HCC): Status: ACTIVE | Noted: 2023-05-16

## 2023-05-16 LAB
ANION GAP SERPL CALCULATED.3IONS-SCNC: 10 MMOL/L (ref 9–17)
BUN SERPL-MCNC: 23 MG/DL (ref 8–23)
BUN/CREAT BLD: 45 (ref 9–20)
CALCIUM SERPL-MCNC: 8.7 MG/DL (ref 8.6–10.4)
CHLORIDE SERPL-SCNC: 104 MMOL/L (ref 98–107)
CO2 SERPL-SCNC: 26 MMOL/L (ref 20–31)
CREAT SERPL-MCNC: 0.51 MG/DL (ref 0.7–1.2)
ERYTHROCYTE [DISTWIDTH] IN BLOOD BY AUTOMATED COUNT: 13.7 % (ref 11.8–14.4)
GFR SERPL CREATININE-BSD FRML MDRD: >60 ML/MIN/1.73M2
GLUCOSE BLD-MCNC: 147 MG/DL (ref 75–110)
GLUCOSE BLD-MCNC: 176 MG/DL (ref 75–110)
GLUCOSE BLD-MCNC: 213 MG/DL (ref 75–110)
GLUCOSE BLD-MCNC: 287 MG/DL (ref 75–110)
GLUCOSE SERPL-MCNC: 144 MG/DL (ref 70–99)
HCT VFR BLD AUTO: 36.4 % (ref 40.7–50.3)
HGB BLD-MCNC: 12.1 G/DL (ref 13–17)
MCH RBC QN AUTO: 31.9 PG (ref 25.2–33.5)
MCHC RBC AUTO-ENTMCNC: 33.2 G/DL (ref 28.4–34.8)
MCV RBC AUTO: 96 FL (ref 82.6–102.9)
NRBC AUTOMATED: 0 PER 100 WBC
PARTIAL THROMBOPLASTIN TIME: 87.7 SEC (ref 23.9–33.8)
PARTIAL THROMBOPLASTIN TIME: 98.4 SEC (ref 23.9–33.8)
PATH REV BLD -IMP: NORMAL
PLATELET # BLD AUTO: 202 K/UL (ref 138–453)
PMV BLD AUTO: 9.8 FL (ref 8.1–13.5)
POTASSIUM SERPL-SCNC: 4.4 MMOL/L (ref 3.7–5.3)
RBC # BLD AUTO: 3.79 M/UL (ref 4.21–5.77)
SODIUM SERPL-SCNC: 140 MMOL/L (ref 135–144)
WBC OTHER # BLD: 17.1 K/UL (ref 3.5–11.3)

## 2023-05-16 PROCEDURE — 6370000000 HC RX 637 (ALT 250 FOR IP): Performed by: INTERNAL MEDICINE

## 2023-05-16 PROCEDURE — 97110 THERAPEUTIC EXERCISES: CPT

## 2023-05-16 PROCEDURE — 85027 COMPLETE CBC AUTOMATED: CPT

## 2023-05-16 PROCEDURE — 6360000002 HC RX W HCPCS: Performed by: NURSE PRACTITIONER

## 2023-05-16 PROCEDURE — 6360000002 HC RX W HCPCS: Performed by: INTERNAL MEDICINE

## 2023-05-16 PROCEDURE — 6370000000 HC RX 637 (ALT 250 FOR IP): Performed by: NURSE PRACTITIONER

## 2023-05-16 PROCEDURE — 97112 NEUROMUSCULAR REEDUCATION: CPT

## 2023-05-16 PROCEDURE — 80048 BASIC METABOLIC PNL TOTAL CA: CPT

## 2023-05-16 PROCEDURE — 85730 THROMBOPLASTIN TIME PARTIAL: CPT

## 2023-05-16 PROCEDURE — 82947 ASSAY GLUCOSE BLOOD QUANT: CPT

## 2023-05-16 PROCEDURE — 97530 THERAPEUTIC ACTIVITIES: CPT

## 2023-05-16 PROCEDURE — 2580000003 HC RX 258: Performed by: NURSE PRACTITIONER

## 2023-05-16 PROCEDURE — 99232 SBSQ HOSP IP/OBS MODERATE 35: CPT | Performed by: INTERNAL MEDICINE

## 2023-05-16 PROCEDURE — 97116 GAIT TRAINING THERAPY: CPT

## 2023-05-16 PROCEDURE — 99232 SBSQ HOSP IP/OBS MODERATE 35: CPT | Performed by: PSYCHIATRY & NEUROLOGY

## 2023-05-16 PROCEDURE — 36415 COLL VENOUS BLD VENIPUNCTURE: CPT

## 2023-05-16 PROCEDURE — 2060000000 HC ICU INTERMEDIATE R&B

## 2023-05-16 RX ADMIN — GABAPENTIN 200 MG: 100 CAPSULE ORAL at 16:26

## 2023-05-16 RX ADMIN — DEXAMETHASONE SODIUM PHOSPHATE 4 MG: 4 INJECTION, SOLUTION INTRAMUSCULAR; INTRAVENOUS at 04:34

## 2023-05-16 RX ADMIN — GABAPENTIN 200 MG: 100 CAPSULE ORAL at 21:24

## 2023-05-16 RX ADMIN — HEPARIN SODIUM 12 UNITS/KG/HR: 10000 INJECTION, SOLUTION INTRAVENOUS at 00:06

## 2023-05-16 RX ADMIN — METOPROLOL SUCCINATE 150 MG: 50 TABLET, EXTENDED RELEASE ORAL at 08:59

## 2023-05-16 RX ADMIN — SODIUM CHLORIDE, PRESERVATIVE FREE 10 ML: 5 INJECTION INTRAVENOUS at 21:24

## 2023-05-16 RX ADMIN — GABAPENTIN 200 MG: 100 CAPSULE ORAL at 08:59

## 2023-05-16 RX ADMIN — INSULIN HUMAN 10 UNITS: 100 INJECTION, SUSPENSION SUBCUTANEOUS at 08:10

## 2023-05-16 RX ADMIN — INSULIN HUMAN 10 UNITS: 100 INJECTION, SUSPENSION SUBCUTANEOUS at 17:20

## 2023-05-16 RX ADMIN — ATORVASTATIN CALCIUM 40 MG: 40 TABLET, FILM COATED ORAL at 21:24

## 2023-05-16 RX ADMIN — PANTOPRAZOLE SODIUM 40 MG: 40 TABLET, DELAYED RELEASE ORAL at 06:39

## 2023-05-16 RX ADMIN — APIXABAN 5 MG: 5 TABLET, FILM COATED ORAL at 21:24

## 2023-05-16 RX ADMIN — APIXABAN 5 MG: 5 TABLET, FILM COATED ORAL at 15:31

## 2023-05-16 RX ADMIN — DILTIAZEM HYDROCHLORIDE 120 MG: 120 CAPSULE, COATED, EXTENDED RELEASE ORAL at 08:59

## 2023-05-16 RX ADMIN — SOTALOL HYDROCHLORIDE 80 MG: 80 TABLET ORAL at 10:41

## 2023-05-16 RX ADMIN — HYDROCODONE BITARTRATE AND ACETAMINOPHEN 1 TABLET: 5; 325 TABLET ORAL at 06:49

## 2023-05-16 RX ADMIN — SODIUM CHLORIDE, PRESERVATIVE FREE 10 ML: 5 INJECTION INTRAVENOUS at 04:34

## 2023-05-16 RX ADMIN — HYDROCODONE BITARTRATE AND ACETAMINOPHEN 1 TABLET: 5; 325 TABLET ORAL at 15:32

## 2023-05-16 RX ADMIN — DEXAMETHASONE SODIUM PHOSPHATE 4 MG: 4 INJECTION, SOLUTION INTRAMUSCULAR; INTRAVENOUS at 16:26

## 2023-05-16 ASSESSMENT — PAIN DESCRIPTION - LOCATION: LOCATION: KNEE

## 2023-05-16 ASSESSMENT — PAIN SCALES - GENERAL
PAINLEVEL_OUTOF10: 5
PAINLEVEL_OUTOF10: 5

## 2023-05-16 ASSESSMENT — PAIN DESCRIPTION - ORIENTATION: ORIENTATION: LEFT;RIGHT

## 2023-05-16 NOTE — PROGRESS NOTES
[facility]   IN-PATIENT SERVICE      NEUROLOGY PROGRESS  NOTE            Date:   5/16/2023  Patient name:  Davon Lo  Date of admission:  5/11/2023  YOB: 1950      Interval History:     No new complaints. MRI brain revealed evidence of tiny acute infarcts in the left cerebral hemisphere and right cerebellum. Patient has been placed back on heparin drip since last night. MRI thoracic and lumbar spine show evidence of metastatic osseous lesions. Status post sacral biopsy. History of Present Illness: The patient is a 74 y.o. male who presents with Hip Pain (Pt states he was at [facility] clinic to get a pain injection for his back when he fell, no LOC, and has pain in Lt hip 10/10, Pt on Blood thinners. ) and Tachycardia  . The patient was seen and examined and the chart was reviewed. Patient has had pain radiating down the left side of his leg and weakness as far as left foot drop for the last several months. MRI of the lumbar spine show evidence of metastatic disease around L5. Also abnormality in right lung apex with spiculated mass concerning for lung malignancy. Past Medical History:     Past Medical History:   Diagnosis Date    A-fib [facility])     Anxiety 10/12/2017    Cardiomyopathy ([address]) 12/7/2020    Formatting of this note might be different from the original. Mixed ischemic and nonischemic (secondary to atrial fibrillation)  Last Assessment & Plan:  Formatting of this note might be different from the original. No symptoms of heart failure or volume overload. Will get an echocardiogram in 3 months to ensure his EF has normalized which I would not be surprised as long as he maintains sinus rh    Chronic obstructive pulmonary disease ([address]) 3/8/2022    Coronary artery disease involving native coronary artery of native heart without angina pectoris 3/5/2021    Formatting of this note might be different from the original. LAD PCI in Mar 2021 & LCx & RCA were IFR'ed.   Last stable to be discharged neurologically from my standpoint. Outpatient follow-up in 6 weeks.         Electronically signed by Khadra Whitaker DO on 5/16/2023 at 10:38 AM      Khadra Whitaker, [address] [place] [address]

## 2023-05-16 NOTE — PROGRESS NOTES
Oxana Rojas, from [facility] called to inquire if Dr. Janice Curran is planning on transferring patient to [facility] neurosurgery, and informed that they have no beds available tonight. [employer] message sent to HEATHER Cid, on-call NP, & informed of MRI results, who sent message back that Dr. Janice Curran is not going to make any changes at this time, and he will discuss with the teams in the morning. [facility] back & informed.

## 2023-05-16 NOTE — PROGRESS NOTES
[facility]  Office: [address], DO, Lucina Yanezs, DO, Sandra Washington, DO, Esthela Quinoness Blood, DO, Nithya Ardon MD, Carolin Perez MD, Silverio Cosby MD, Lesley Cordero MD,  Rama Aponte MD, Tenzin Johnson MD, Bob Mccauley, DO, Abrahan Simeon MD,  Marcos Yeager MD, Domi Pan MD, Monika Soria, DO, Shlomo Shukla MD, Indy Pickett MD, Ciaran Perez, DO, Farida Calhoun MD, Ricardo Kelley MD, Kayla Leslie MD, Travis Schroeder MD,  Alicja Hines, DO, Kira Gonzalez MD,  Todd White, CNP,  Tatiana Otto CNP, Jaden Araiza, CNP, Javid León, CNP,  Ignacia Whitmore, DNP, Bright Syed, CNP, Edwin Lilly, CNP, Angela Fermin, CNP, Lily Erazo, CNP, Cory Ying, CNP, Kris Laws PA-C, Holland Kussmaul, LEOBARDO, Aida Burch, CNP, Ria London, [address] [facility]    Progress Note    5/16/2023    1:23 PM    Name:   Rere Gusman  MRN:     [id number]     [address]:      [de-identified]   Room:   1007/1007-02  IP Day:  5  Admit Date:  5/11/2023 10:32 AM    PCP:   Dallas Cornejo MD  Code Status:  Full Code    Subjective:     Patient doing well today. His wife is at bedside. He says he slept better today. No fevers, chills, nausea, vomiting, diarrhea. Still having some lower ext weakness  Brief History: This is a 58-year-old male who presents to the hospital for evaluation after falling. He says prior to fall patient was feeling lightheaded and dizzy, denies losing consciousness or hitting his head. He was found to be in atrial fibrillation with rapid ventricular response. He was started on Cardizem drip and his home Toprol was restarted. Echocardiogram showed: Cardiology evaluated patient and recommended:     Patient did have some bilateral lower extremity weakness so he underwent imaging.   Initial MRI shows findings of bony metastasis along with paraspinal soft tissue extension of the tumor sciatica 5/13/2023 Yes    Abnormal magnetic resonance imaging of lumbar spine 5/13/2023 Yes    Malignant neoplasm metastatic to bone ([address] 5/14/2023 Yes    Malignant neoplasm metastatic to both lungs ([address] 5/14/2023 Yes    Weakness of both lower extremities 5/14/2023 Yes    Compression of sacral nerve root 5/14/2023 Yes    Acute cystitis without hematuria 5/14/2023 Yes    Hyperglycemia 5/14/2023 Yes   Plan:     Acute small punctate infarcts in the left cerebral right cerebellar hemispheres  Plan to transition to Eliquis 5 mg twice daily. Continue Lipitor 40 mg daily  New metastatic carcinoma possibly of lung origin with spread to bone causing lower extremity weakness and foot drop  Spoke with Neurosurgery  on 5/16, recommend radiation therapy. Oncology following, Will follow up on results of biopsy. Will need assistance coordinating radiation, may  need transfer to - will consult RAD ONC for recommendations. Atrial fibrillation with RVR- now in NSR  Cardizem 120 mg daily, metoprolol 150 mg daily, started on sotalol with cardiology.    Started on Eliquis  Hyperglycemia likely due to steroid use  Add NPH to cover steroid effect, monitor for hypoglycemia   Hgb A1c was 6.1c  Thrombocytopenia   Resolved  History of CAD with EBER to LAD in 3/2021 has residual nonobstructive lesions in RCA and CX  Reviewed in care everywhere note from his cardiologist  Continue on Plavix, Lipitor   COPD without exacerbation   Continue current treatment  Dyslipidemia : statin      Medical Decision Making: Medium    Discussed with family   Amievida DO Mike  5/16/2023  1:23 PM

## 2023-05-16 NOTE — PROGRESS NOTES
Physical Therapy  Facility/Department: [facility] PROGRESSIVE CARE  Daily Treatment Note  NAME: Lisa Sterling  : 1950  MRN: [id number]    Date of Service: 2023    Discharge Recommendations:  Patient would benefit from continued therapy after discharge      Pt is currently functional below baseline and recommend comprehensive and intensive skilled therapy by a multidisciplinary team. Would expect patient to be able to tolerate 3 hours of therapy per day. Please refer to AM-PAC score for current mobility/adl level. Patient Diagnosis(es): The encounter diagnosis was Atrial fibrillation with RVR ([address] Assessment   Assessment: Patient slowly progressing toward STGs and was able to increased gait distance today, but patient limited by fatigued, L foot drop, LLE buckling, and significant increased in pain of LT hip. Noted improve tolerance and slight decreased in LLE pain with ambulated with knee immoblizier. But continues to not be able to tolerate sitting up in chair  and assisted back to bed with Mod-Max x 2 A. Patient would benefit from continued skilled PT services. Activity Tolerance: Patient limited by pain;Treatment limited secondary to decreased cognition;Patient limited by endurance     Plan    Physcial Therapy Plan  General Plan: 5-7 times per week  Current Treatment Recommendations: Strengthening;ROM;Balance training;Functional mobility training;Transfer training; Endurance training;Gait training;Neuromuscular re-education;Patient/Caregiver education & training; Safety education & training;Home exercise program;Equipment evaluation, education, & procurement; Therapeutic activities     Restrictions  Restrictions/Precautions  Restrictions/Precautions: Fall Risk, General Precautions, Bed Alarm, Up as Tolerated  Required Braces or Orthoses?: No  Position Activity Restriction  Other position/activity restrictions:  Activity as tolerated, Up w/ assist, maintain heels off bed, telemetry, RUE IV place;Call light within reach;Gait belt;Patient at risk for falls; Left in bed;Nurse notified  Restraints  Restraints Initially in Place: No       Goals  Short Term Goals  Time Frame for Short Term Goals: 12 visits  Short Term Goal 1: Patient will be CGA for bed mobility. Short Term Goal 2: Patient will transfers with min assist.  Short Term Goal 3: Patient will amb 10 feet with RW and mon assist.  Short Term Goal 4: Patient will demo 4-/5 strength throughout BLE. Short Term Goal 5: Patient will tolerate 30 minutes of ther-ex and ther-act. Patient Goals   Patient Goals : Pain control, improve walking    Education  Patient Education  Education Given To: Patient  Education Provided: Role of Therapy;Plan of Care;Transfer Training; Fall Prevention Strategies; Equipment  Education Provided Comments: Pt educated on purpose of acute PT treatment, importance of continued mobility throughout admission, safety awareness, safe transfers & ambulation w/ RW, circulation ex's, pressure relief, breathing techniques, prevention of sedentary/secondary complications, and incentive spirometer usage\" ,and \"PT POC. Pt demonstrated POOR carryover  Pt requires continued reinforcement of education. Education Method: Verbal;Demonstration  Barriers to Learning: Cognition  Education Outcome: Verbalized understanding;Continued education needed    Therapy Time   Individual Concurrent Group Co-treatment   Time In       46   Time Out       200   Minutes       36       Co-treatment with OT warranted secondary to decreased safety and independence requiring 2 skilled therapy professionals to address individual discipline's goals. PT addressing pre gait trunk strengthening, weight shifting prior to transfers, transfer training, and postural control in sitting/standing.      Everardo Jackson, PTA

## 2023-05-16 NOTE — PROGRESS NOTES
Dr. Marilia Owens here to see pt and discuss options. Waiting on full results from biopsy before they make a final decision regarding treatment process.

## 2023-05-16 NOTE — PLAN OF CARE
Problem: Discharge Planning  Goal: Discharge to home or other facility with appropriate resources  Outcome: Progressing  Note: Discharge teaching and instructions for diagnosis/procedure explained with patient using teachback method. Patient voiced understanding regarding prescriptions, follow up appointments, and care of self at home. Problem: Safety - Adult  Goal: Free from fall injury  Outcome: Progressing  Note: Pt fall risk, fall band present, falling star, safety alarm activated and in use as needed. Hourly rounding performed. Pt encouraged to use call light. See Imani Mcduffie fall risk assessment. Problem: Skin/Tissue Integrity  Goal: Absence of new skin breakdown  Description: 1. Monitor for areas of redness and/or skin breakdown  2. Assess vascular access sites hourly  3. Every 4-6 hours minimum:  Change oxygen saturation probe site  4. Every 4-6 hours:  If on nasal continuous positive airway pressure, respiratory therapy assess nares and determine need for appliance change or resting period. Outcome: Progressing  Note: Continuing to monitor for skin integrity risks. Patient independent with turning/repositioning. Turning/repositioning encouraged at least once every 2 hrs, and prn basis. Hygiene care being completed independently per patient; assistance provided when deemed necessary. Problem: ABCDS Injury Assessment  Goal: Absence of physical injury  Outcome: Progressing  Note: Non-skid socks in place, up with assistance, bed in lowest position, bed exit & alarm as needed, provide toileting every 2 hours an d as needed. Problem: Pain  Goal: Verbalizes/displays adequate comfort level or baseline comfort level  Outcome: Progressing  Note: Monitoring pain with each assessment and prn. SHIRA 0-10 pain scale utilized. Non-pharmacological measures to be encouraged prior to pharmacological measures.        Problem: Nutrition Deficit:  Goal: Optimize nutritional status  Outcome:

## 2023-05-16 NOTE — PROGRESS NOTES
Occupational Therapy  Facility/Department: [facility] PROGRESSIVE CARE  Rehabilitation Occupational Therapy Daily Treatment Note    Date: 23  Patient Name: Magaly Nava       Room: 1509/2380-92  MRN: [id number]  Account: [de-identified]   : 1950  (74 y.o.) Gender: male        Pt is currently functional below baseline and recommend comprehensive and intensive skilled therapy by a multidisciplinary team. Would expect patient to be able to tolerate 3 hours of therapy per day and able to tolerate at least one hour up in chair. Please refer to AM-PAC score for current mobility/adl level. Past Medical History:  has a past medical history of A-fib ([address]), Anxiety, Cardiomyopathy ([address]), Chronic obstructive pulmonary disease ([address]), Coronary artery disease involving native coronary artery of native heart without angina pectoris, and Hyperlipidemia. Past Surgical History:   has a past surgical history that includes Cardioversion; Colonoscopy; Cardiac catheterization; Appendectomy; and CT BIOPSY DEEP BONE PERCUTANEOUS (5/15/2023). Restrictions  Restrictions/Precautions: Fall Risk, General Precautions, Bed Alarm, Up as Tolerated  Other position/activity restrictions: Activity as tolerated, Up w/ assist, maintain heels off bed, telemetry, RUE IV  Required Braces or Orthoses?: No    Subjective  Subjective: Pt resting in bed upon arrival agreeable to therapy, wife at bedside. Restrictions/Precautions: Fall Risk;General Precautions; Bed Alarm; Up as Tolerated             Objective     Cognition  Overall Cognitive Status: Exceptions  Arousal/Alertness: Appropriate responses to stimuli;Delayed responses to stimuli  Following Commands: Follows one step commands with increased time; Follows one step commands with repetition; Inconsistently follows commands  Attention Span: Attends with cues to redirect; Difficulty attending to directions; Difficulty dividing attention  Memory: Decreased short term memory;Decreased on with MAX A x2. Attempted to make pt comfortable in recliner with no success, pt returned to bed at end of session with knee immobilizer on and drop foot boot on left foot. Educated pt to call out to staff if he wants the immobilizer removed and not to wear it over night, pt verbalized understanding. Pt's IV was bleeding, RN notofied and also informed to use tao stedy for all transfers at this time. Neuromuscular Education  Neuromuscular education: Yes  NDT Treatment: Gait ;Sitting;Standing     Assessment  Assessment  Assessment: Pt was able to tolerate multiple STSs and transfers to/from recliner using RW with MAX A x2 but was unable to tolerate sitting in recliner for extended period d/t increased pain in L hip. Attempted to help pt get comfortable but was unable and pt needed to return to bed. Pt also had difficulty with identifying mistakes. Pt still currently requires 2 staff assist for bed mobility,  ADL transfers and functional mobility tasks. Skilled OT services are indicated at this time to maximize this pt's safety and IND with self care tasks and to facilitate safe return to PLOF as able. Activity Tolerance: Patient limited by pain;Treatment limited secondary to decreased cognition;Patient limited by endurance  Discharge Recommendations: Patient would benefit from continued therapy after discharge  Safety Devices  Safety Devices in place: Yes  Type of devices: Call light within reach; Left in bed;Patient at risk for falls; Bed alarm in place; All fall risk precautions in place;Gait belt;Nurse notified    Patient Education  Education  Education Given To: Patient; Family  Education Provided: Mobility Training; Fall Prevention Strategies;Transfer Training;Energy Conservation;Home Exercise Program;Precautions; Safety;Equipment  Education Method: Verbal;Demonstration; Teach Back  Barriers to Learning: Cognition  Education Outcome: Verbalized understanding;Continued education needed    Plan  Occupational Therapy

## 2023-05-16 NOTE — CARE COORDINATION
Discharge Planning    Received call from Dr Serena Bonner stating he has ordered a radiation oncologist consult to determine when to start radiation. Explained to  that pt will not be accepted at rehab while on radiation.  feels radiation may help to relieve some of pt's symptoms. Kamilla Daily updated. Will have to determine plan of care after radiation oncologist sees pt.

## 2023-05-16 NOTE — PROGRESS NOTES
Patient started on Heparin gtt this evening, resulting from MRI of brain results. Dr. Tra Clayton notified by NP. No bleeding noted at CT biopsy site.

## 2023-05-16 NOTE — PROGRESS NOTES
Dr Lyle Gotti updated on MRI brain result of tiny acute ischemic infarcts. Patient has a hx of atrial fib and has been off AC so that he could undergo sacral biopsy earlier today. Low intensity heparin gtt with no bolus to be started. Dr Kriss Choi updated of Dr Lyle Gotti agreement with initiation of heparin gtt. Spoke with patient primary RN Kristi Nazario and notified order placed for low intensity heparin with no bolus. Nursing to continue to monitor patient closely for any adverse reactions to procedures/ treatments.

## 2023-05-16 NOTE — PROGRESS NOTES
Radiation onlcology consult received. Writer paged answering service, patient information given. Writer told to expect a call back from either Dr. Rain Lane or Dr. Chandan Whiting, whoever was on call.

## 2023-05-17 ENCOUNTER — HOSPITAL ENCOUNTER (INPATIENT)
Age: 73
LOS: 2 days | Discharge: HOSPICE | DRG: 477 | End: 2023-05-19
Attending: STUDENT IN AN ORGANIZED HEALTH CARE EDUCATION/TRAINING PROGRAM | Admitting: STUDENT IN AN ORGANIZED HEALTH CARE EDUCATION/TRAINING PROGRAM
Payer: MEDICARE

## 2023-05-17 VITALS
WEIGHT: 153.5 LBS | BODY MASS INDEX: 21.98 KG/M2 | TEMPERATURE: 98.2 F | HEART RATE: 50 BPM | OXYGEN SATURATION: 96 % | RESPIRATION RATE: 18 BRPM | HEIGHT: 70 IN | DIASTOLIC BLOOD PRESSURE: 67 MMHG | SYSTOLIC BLOOD PRESSURE: 103 MMHG

## 2023-05-17 PROBLEM — G95.20 CORD COMPRESSION (HCC): Status: ACTIVE | Noted: 2023-05-17

## 2023-05-17 PROBLEM — G95.20 SPINAL CORD COMPRESSION (HCC): Status: ACTIVE | Noted: 2023-05-17

## 2023-05-17 LAB
EKG ATRIAL RATE: 70 BPM
EKG P AXIS: 68 DEGREES
EKG P-R INTERVAL: 140 MS
EKG Q-T INTERVAL: 260 MS
EKG Q-T INTERVAL: 448 MS
EKG QRS DURATION: 80 MS
EKG QRS DURATION: 90 MS
EKG QTC CALCULATION (BAZETT): 401 MS
EKG QTC CALCULATION (BAZETT): 483 MS
EKG R AXIS: 0 DEGREES
EKG R AXIS: 8 DEGREES
EKG T AXIS: 44 DEGREES
EKG T AXIS: 82 DEGREES
EKG VENTRICULAR RATE: 143 BPM
EKG VENTRICULAR RATE: 70 BPM
ERYTHROCYTE [DISTWIDTH] IN BLOOD BY AUTOMATED COUNT: 13.6 % (ref 11.8–14.4)
GLUCOSE BLD-MCNC: 210 MG/DL (ref 75–110)
GLUCOSE BLD-MCNC: 218 MG/DL (ref 75–110)
GLUCOSE BLD-MCNC: 223 MG/DL (ref 75–110)
HCT VFR BLD AUTO: 37.2 % (ref 40.7–50.3)
HGB BLD-MCNC: 12.3 G/DL (ref 13–17)
MCH RBC QN AUTO: 31.9 PG (ref 25.2–33.5)
MCHC RBC AUTO-ENTMCNC: 33.1 G/DL (ref 28.4–34.8)
MCV RBC AUTO: 96.6 FL (ref 82.6–102.9)
NRBC AUTOMATED: 0 PER 100 WBC
PLATELET # BLD AUTO: 209 K/UL (ref 138–453)
PMV BLD AUTO: 10 FL (ref 8.1–13.5)
RBC # BLD AUTO: 3.85 M/UL (ref 4.21–5.77)
SPECIMEN TYPE: NORMAL
SPECIMEN VOL UR: NORMAL ML
SURGICAL PATHOLOGY REPORT: NORMAL
URINE IFX INTERP: NORMAL
URINE TOTAL PROTEIN: 12 MG/DL
WBC OTHER # BLD: 19.3 K/UL (ref 3.5–11.3)

## 2023-05-17 PROCEDURE — 1210000000 HC MED SURG R&B

## 2023-05-17 PROCEDURE — 2580000003 HC RX 258: Performed by: STUDENT IN AN ORGANIZED HEALTH CARE EDUCATION/TRAINING PROGRAM

## 2023-05-17 PROCEDURE — 99222 1ST HOSP IP/OBS MODERATE 55: CPT | Performed by: INTERNAL MEDICINE

## 2023-05-17 PROCEDURE — 6370000000 HC RX 637 (ALT 250 FOR IP): Performed by: NURSE PRACTITIONER

## 2023-05-17 PROCEDURE — 6370000000 HC RX 637 (ALT 250 FOR IP): Performed by: PSYCHIATRY & NEUROLOGY

## 2023-05-17 PROCEDURE — 99222 1ST HOSP IP/OBS MODERATE 55: CPT | Performed by: STUDENT IN AN ORGANIZED HEALTH CARE EDUCATION/TRAINING PROGRAM

## 2023-05-17 PROCEDURE — 82947 ASSAY GLUCOSE BLOOD QUANT: CPT

## 2023-05-17 PROCEDURE — 99231 SBSQ HOSP IP/OBS SF/LOW 25: CPT | Performed by: PSYCHIATRY & NEUROLOGY

## 2023-05-17 PROCEDURE — 6370000000 HC RX 637 (ALT 250 FOR IP): Performed by: INTERNAL MEDICINE

## 2023-05-17 PROCEDURE — 93010 ELECTROCARDIOGRAM REPORT: CPT | Performed by: INTERNAL MEDICINE

## 2023-05-17 PROCEDURE — 82232 ASSAY OF BETA-2 PROTEIN: CPT

## 2023-05-17 PROCEDURE — 85027 COMPLETE CBC AUTOMATED: CPT

## 2023-05-17 PROCEDURE — 6360000002 HC RX W HCPCS: Performed by: INTERNAL MEDICINE

## 2023-05-17 PROCEDURE — 6370000000 HC RX 637 (ALT 250 FOR IP): Performed by: STUDENT IN AN ORGANIZED HEALTH CARE EDUCATION/TRAINING PROGRAM

## 2023-05-17 PROCEDURE — 84156 ASSAY OF PROTEIN URINE: CPT

## 2023-05-17 PROCEDURE — 6360000002 HC RX W HCPCS: Performed by: STUDENT IN AN ORGANIZED HEALTH CARE EDUCATION/TRAINING PROGRAM

## 2023-05-17 PROCEDURE — 86335 IMMUNFIX E-PHORSIS/URINE/CSF: CPT

## 2023-05-17 PROCEDURE — 36415 COLL VENOUS BLD VENIPUNCTURE: CPT

## 2023-05-17 PROCEDURE — 2580000003 HC RX 258: Performed by: NURSE PRACTITIONER

## 2023-05-17 RX ORDER — INSULIN LISPRO 100 [IU]/ML
0-4 INJECTION, SOLUTION INTRAVENOUS; SUBCUTANEOUS
Status: DISCONTINUED | OUTPATIENT
Start: 2023-05-17 | End: 2023-05-19

## 2023-05-17 RX ORDER — INSULIN LISPRO 100 [IU]/ML
0-4 INJECTION, SOLUTION INTRAVENOUS; SUBCUTANEOUS NIGHTLY
Status: DISCONTINUED | OUTPATIENT
Start: 2023-05-17 | End: 2023-05-19

## 2023-05-17 RX ORDER — ONDANSETRON 2 MG/ML
4 INJECTION INTRAMUSCULAR; INTRAVENOUS EVERY 6 HOURS PRN
Status: DISCONTINUED | OUTPATIENT
Start: 2023-05-17 | End: 2023-05-19 | Stop reason: HOSPADM

## 2023-05-17 RX ORDER — ACETAMINOPHEN 325 MG/1
650 TABLET ORAL EVERY 6 HOURS PRN
Status: DISCONTINUED | OUTPATIENT
Start: 2023-05-17 | End: 2023-05-19 | Stop reason: HOSPADM

## 2023-05-17 RX ORDER — SODIUM CHLORIDE 0.9 % (FLUSH) 0.9 %
5-40 SYRINGE (ML) INJECTION PRN
Status: DISCONTINUED | OUTPATIENT
Start: 2023-05-17 | End: 2023-05-19 | Stop reason: HOSPADM

## 2023-05-17 RX ORDER — ACETAMINOPHEN 650 MG/1
650 SUPPOSITORY RECTAL EVERY 6 HOURS PRN
Status: DISCONTINUED | OUTPATIENT
Start: 2023-05-17 | End: 2023-05-19 | Stop reason: HOSPADM

## 2023-05-17 RX ORDER — SODIUM CHLORIDE 0.9 % (FLUSH) 0.9 %
5-40 SYRINGE (ML) INJECTION EVERY 12 HOURS SCHEDULED
Status: DISCONTINUED | OUTPATIENT
Start: 2023-05-17 | End: 2023-05-19

## 2023-05-17 RX ORDER — ENOXAPARIN SODIUM 100 MG/ML
40 INJECTION SUBCUTANEOUS DAILY
Status: DISCONTINUED | OUTPATIENT
Start: 2023-05-17 | End: 2023-05-17

## 2023-05-17 RX ORDER — ONDANSETRON 4 MG/1
4 TABLET, ORALLY DISINTEGRATING ORAL EVERY 8 HOURS PRN
Status: DISCONTINUED | OUTPATIENT
Start: 2023-05-17 | End: 2023-05-19 | Stop reason: HOSPADM

## 2023-05-17 RX ORDER — METOPROLOL SUCCINATE 100 MG/1
100 TABLET, EXTENDED RELEASE ORAL DAILY
Status: DISCONTINUED | OUTPATIENT
Start: 2023-05-18 | End: 2023-05-18

## 2023-05-17 RX ORDER — GABAPENTIN 300 MG/1
300 CAPSULE ORAL 3 TIMES DAILY
Status: DISCONTINUED | OUTPATIENT
Start: 2023-05-17 | End: 2023-05-17 | Stop reason: HOSPADM

## 2023-05-17 RX ORDER — DEXAMETHASONE SODIUM PHOSPHATE 4 MG/ML
4 INJECTION, SOLUTION INTRA-ARTICULAR; INTRALESIONAL; INTRAMUSCULAR; INTRAVENOUS; SOFT TISSUE EVERY 6 HOURS
Status: DISCONTINUED | OUTPATIENT
Start: 2023-05-17 | End: 2023-05-19

## 2023-05-17 RX ORDER — ATORVASTATIN CALCIUM 40 MG/1
40 TABLET, FILM COATED ORAL NIGHTLY
Status: DISCONTINUED | OUTPATIENT
Start: 2023-05-17 | End: 2023-05-19

## 2023-05-17 RX ORDER — DEXTROSE MONOHYDRATE 100 MG/ML
INJECTION, SOLUTION INTRAVENOUS CONTINUOUS PRN
Status: DISCONTINUED | OUTPATIENT
Start: 2023-05-17 | End: 2023-05-19 | Stop reason: HOSPADM

## 2023-05-17 RX ORDER — POLYETHYLENE GLYCOL 3350 17 G/17G
17 POWDER, FOR SOLUTION ORAL DAILY PRN
Status: DISCONTINUED | OUTPATIENT
Start: 2023-05-17 | End: 2023-05-19 | Stop reason: HOSPADM

## 2023-05-17 RX ORDER — HYDROCODONE BITARTRATE AND ACETAMINOPHEN 5; 325 MG/1; MG/1
1 TABLET ORAL EVERY 6 HOURS PRN
Status: DISCONTINUED | OUTPATIENT
Start: 2023-05-17 | End: 2023-05-19 | Stop reason: HOSPADM

## 2023-05-17 RX ORDER — SODIUM CHLORIDE 9 MG/ML
INJECTION, SOLUTION INTRAVENOUS PRN
Status: DISCONTINUED | OUTPATIENT
Start: 2023-05-17 | End: 2023-05-19 | Stop reason: HOSPADM

## 2023-05-17 RX ADMIN — APIXABAN 5 MG: 5 TABLET, FILM COATED ORAL at 20:58

## 2023-05-17 RX ADMIN — PANTOPRAZOLE SODIUM 40 MG: 40 TABLET, DELAYED RELEASE ORAL at 08:18

## 2023-05-17 RX ADMIN — SODIUM CHLORIDE, PRESERVATIVE FREE 10 ML: 5 INJECTION INTRAVENOUS at 08:21

## 2023-05-17 RX ADMIN — SODIUM CHLORIDE, PRESERVATIVE FREE 10 ML: 5 INJECTION INTRAVENOUS at 21:00

## 2023-05-17 RX ADMIN — INSULIN HUMAN 10 UNITS: 100 INJECTION, SUSPENSION SUBCUTANEOUS at 08:18

## 2023-05-17 RX ADMIN — DILTIAZEM HYDROCHLORIDE 120 MG: 120 CAPSULE, COATED, EXTENDED RELEASE ORAL at 08:34

## 2023-05-17 RX ADMIN — HYDROCODONE BITARTRATE AND ACETAMINOPHEN 1 TABLET: 5; 325 TABLET ORAL at 13:25

## 2023-05-17 RX ADMIN — DEXAMETHASONE SODIUM PHOSPHATE 4 MG: 4 INJECTION, SOLUTION INTRAMUSCULAR; INTRAVENOUS at 03:53

## 2023-05-17 RX ADMIN — DEXAMETHASONE SODIUM PHOSPHATE 4 MG: 4 INJECTION, SOLUTION INTRAMUSCULAR; INTRAVENOUS at 18:49

## 2023-05-17 RX ADMIN — SOTALOL HYDROCHLORIDE 80 MG: 80 TABLET ORAL at 08:34

## 2023-05-17 RX ADMIN — HYDROCODONE BITARTRATE AND ACETAMINOPHEN 1 TABLET: 5; 325 TABLET ORAL at 20:57

## 2023-05-17 RX ADMIN — HYDROCODONE BITARTRATE AND ACETAMINOPHEN 1 TABLET: 5; 325 TABLET ORAL at 08:18

## 2023-05-17 RX ADMIN — ATORVASTATIN CALCIUM 40 MG: 40 TABLET, FILM COATED ORAL at 20:58

## 2023-05-17 RX ADMIN — GABAPENTIN 300 MG: 300 CAPSULE ORAL at 13:25

## 2023-05-17 RX ADMIN — METOPROLOL SUCCINATE 150 MG: 50 TABLET, EXTENDED RELEASE ORAL at 08:34

## 2023-05-17 RX ADMIN — GABAPENTIN 200 MG: 100 CAPSULE ORAL at 08:34

## 2023-05-17 RX ADMIN — APIXABAN 5 MG: 5 TABLET, FILM COATED ORAL at 08:34

## 2023-05-17 ASSESSMENT — LIFESTYLE VARIABLES
HOW OFTEN DO YOU HAVE A DRINK CONTAINING ALCOHOL: MONTHLY OR LESS
HOW MANY STANDARD DRINKS CONTAINING ALCOHOL DO YOU HAVE ON A TYPICAL DAY: 1 OR 2

## 2023-05-17 ASSESSMENT — PAIN DESCRIPTION - FREQUENCY
FREQUENCY: INTERMITTENT

## 2023-05-17 ASSESSMENT — PAIN SCALES - GENERAL
PAINLEVEL_OUTOF10: 3
PAINLEVEL_OUTOF10: 2
PAINLEVEL_OUTOF10: 6
PAINLEVEL_OUTOF10: 6
PAINLEVEL_OUTOF10: 0
PAINLEVEL_OUTOF10: 3
PAINLEVEL_OUTOF10: 2

## 2023-05-17 ASSESSMENT — PAIN DESCRIPTION - LOCATION
LOCATION: LEG
LOCATION: HIP
LOCATION: HIP;LEG
LOCATION: HIP
LOCATION: HIP
LOCATION: LEG

## 2023-05-17 ASSESSMENT — PAIN DESCRIPTION - PAIN TYPE
TYPE: ACUTE PAIN;CHRONIC PAIN
TYPE: ACUTE PAIN;CHRONIC PAIN

## 2023-05-17 ASSESSMENT — PAIN DESCRIPTION - ORIENTATION
ORIENTATION: LEFT

## 2023-05-17 ASSESSMENT — PAIN DESCRIPTION - DESCRIPTORS
DESCRIPTORS: THROBBING
DESCRIPTORS: THROBBING

## 2023-05-17 NOTE — PROGRESS NOTES
Physical Therapy  DATE: 2023    NAME: Clarice Lefort  MRN: [id number]   : 1950    Patient not seen this date for Physical Therapy due to:      [] Cancel by RN or physician due to:    [] Hemodialysis    [] Critical Lab Value Level     [] Blood transfusion in progress    [] Acute or unstable cardiovascular status   _MAP < 55 or more than >115  _HR < 40 or > 130    [] Acute or unstable pulmonary status   -FiO2 > 60%   _RR < 5 or >40    _O2 sats < 85%    [] Strict Bedrest    [] Off Unit for surgery or procedure    [] Off Unit for testing       [] Pending imaging to R/O fracture    [x] Refusal by Patient (Pt in too much pain and stated \"maybe later. \" Pt stated his pain meds are being \"upped\" this afternoon. Will cont to follow. RN, [address] notified of cancel.)     [] Other      [] PT being discontinued at this time. Patient independent. No further needs. [] PT being discontinued at this time as the patient has been transferred to hospice care. No further needs.       Warner Espinoza, PTA

## 2023-05-17 NOTE — H&P
[facility]  Office: [address], DO, Odilia Mcintosh, DO, Gil Quinonse, DO, Esther Dani Blood, DO, Lito Mendosa MD, Rachid Brown MD, Modesto Diaz MD, Shubham Wen MD,  Scarlet Duval MD, Larry Langley MD, Doris Doran, DO, Mayur Arboleda MD,  Constantine Stark MD, Florina Grayson MD, Lyle Butler, DO, Mary Ramon MD, Meron Melvin MD, Macie Rosenberg, DO, Mohan Velez MD, Andre Boyle MD, Arminda Meyer MD, Leah Gongora MD,  Blaise Lowery, DO, Shadia Zhang MD,  Clive Landis CNP,  Cleveland Bass, CNP, Craig Sever, CNP, Rodo Vega, CNP,  Desiree Ware, DNP, Abhijeet Glasgow, CNP, Leela Burkett, CNP, Zoie Bhatia, CNP, Annette Hector, CNP, Kirt Dolan, CNP, Belinda Mckinney PA-C, Ingris Burgos, CNS, Patty Villeda, CNP, Danial Aguilar, CNP         [address]    HISTORY AND PHYSICAL EXAMINATION            Date:   5/17/2023  Patient name:  Felicia Pollard  Date of admission:  5/17/2023  5:09 PM  MRN:   [id number]  Account:  [de-identified]  YOB: 1950  PCP:    Sherwood Mcburney, MD  Room:   [address] [place] [address]  Code Status:    DNR-CCA      History Obtained From:     patient    History of Present Illness:     Felicia Pollard is a 74 y.o. Non- / non  male with a past medical history of recently diagnosed Stage IV cancer of unknown primary (likely lung), atrial fibrillation, hypertension and hyperlipidemia who presented to this facility as a transfer from [facility] for palliative radiation. The patient initially presented to [facility] on 5/11 with complaints of tachycardia and left hip pain following a fall at home. In the ED, the patient was found to be in atrial fibrillation with rapid ventricular response and was started on diltiazem drip. His atrial fibrillation with rapid ventricular response resolved during the patient's hospital stay at [facility]  He continued to complain of

## 2023-05-17 NOTE — PROGRESS NOTES
[address]  Occupational Therapy Not Seen    DATE: 2023    NAME: Hannah Parkinson  MRN: [id number]   : 1950    Patient not seen this date for Occupational Therapy due to:      [] Cancel by RN or physician due to:    [] Hemodialysis    [] Critical Lab Value Level     [] Blood transfusion in progress    [] Acute or unstable cardiovascular status   _MAP < 55 or more than >115  _HR < 40 or > 130    [] Acute or unstable pulmonary status   -FiO2 > 60%   _RR < 5 or >40    _O2 sats < 85%    [] Strict Bedrest    [] Off Unit for surgery or procedure    [] Off Unit for testing       [] Pending imaging to R/O fracture    [x] Refusal by Patient: Pt in too much pain and stated \"maybe later. \" Pt stated his pain meds are being \"upped\" this afternoon. Will cont to follow. [] Other      [] OT being discontinued at this time. Patient independent. No further needs. [] OT being discontinued at this time as the patient has been transferred to hospice care. No further needs.       DALE Arellano

## 2023-05-17 NOTE — CARE COORDINATION
DC Planning    Plan to transfer to [facility] for IP radiation then will need rehab.

## 2023-05-17 NOTE — PROGRESS NOTES
Writer informed by clin lead that patient will be picked up via [facility] at 4:30pm. Will inform family.

## 2023-05-17 NOTE — PROGRESS NOTES
[facility]  Office: [id number]  1826, DO, Eriberto Vitale, DO, Suad Corrigan, DO, Mikey Park Blood, DO, Sandy Johnson MD, Vik Avalos MD, Elham Jack MD, Grupo Nava MD,  Kassie Grace MD, Cailin Ortega MD, Delcia Epley, DO, Vito Menendez MD,  Madhav Carver MD, Narda Wild MD, Ranjith Dumont, DO, Van Wheeler MD, Carey Moncada MD, Deepika Holloway DO, Dany Slade MD, Corrina Beasley MD, Violeta Caraballo MD, Gopal Garza MD,  Gris Pineda DO, Jennifer Vicente MD,  Micah Simpson CNP,  Lisa Lockhart, CNP, Tray Gaspar, CNP, Vicki Mahmood, CNP,  [place], DNP, Shahram Mccain, CNP, Schuyler Zepeda, CNP, Soheila Shook, CNP, Umair Jain, CNP, Princess Villalba, CNP, Jamie Erwin PAAdelsoC, Amol Ibarra, CNS, Chuy Hilario, CNP, Sisi Gonzales, [facility]    Progress Note    5/17/2023    9:22 AM    Name:   Santa Cleaning  MRN:     [id number]     [address]:      [de-identified]   Room:   1007/1007-02  IP Day:  6  Admit Date:  5/11/2023 10:32 AM    PCP:   Junior Robles MD  Code Status:  Full Code    Subjective:     He continues to have pain in left leg with numbness and tingling along weakness worse in left. Otherwise no complaints   Brief History: This is a 77-year-old male who presents to the hospital for evaluation after falling. He says prior to fall patient was feeling lightheaded and dizzy, denies losing consciousness or hitting his head. He was found to be in atrial fibrillation with rapid ventricular response. He was started on Cardizem drip and his home Toprol was restarted. Echocardiogram showed: Cardiology evaluated patient and recommended:     Patient did have some bilateral lower extremity weakness so he underwent imaging.   Initial MRI shows findings of bony metastasis along with paraspinal soft tissue extension of the tumor and severe foraminal narrowing at L5-S1 with normal affect  Lungs:  clear to auscultation bilaterally, normal effort  Heart: Regular rate and regular rhythm, no murmur  Abdomen:  soft, nontender, nondistended, normal bowel sounds, no masses, hepatomegaly, splenomegaly  Extremities:  no edema, redness, tenderness in the calves 4 out of 5 muscle strength in lower extremities bilaterally with loss of sensation/numbness in left foot up to his knee. Proprioception is intact in feet. Muscle strength 5 out of 5 bilaterally in upper extremities. Rest of cranial nerves are intact  Skin:  no gross lesions, rashes, induration    Assessment:     Hospital Problems             Last Modified POA    * (Principal) Embolic stroke ([address]) 6/63/6633 Yes    Compression of lumbar nerve root 5/16/2023 Yes    Atrial fibrillation with RVR ([address]) 5/15/2023 Yes    Chronic obstructive pulmonary disease ([address]) (Chronic) 5/11/2023 Yes    Coronary artery disease involving native coronary artery of native heart without angina pectoris (Chronic) 5/11/2023 Yes    Overview Signed 5/11/2023  4:44 PM by ANGELA Parks CNP     Formatting of this note might be different from the original.  LAD PCI in Mar 2021 & LCx & RCA were IFR'ed. Last Assessment & Plan:   Formatting of this note might be different from the original.  Was discharged on triple therapy after the PCI. What he told me he has been asked to stop his aspirin after April 4th. On statin therapy. Would recommend LDL below 70. Hyperlipidemia (Chronic) 5/11/2023 Yes    Overview Signed 5/11/2023  4:44 PM by ANGELA Parks CNP     Last Assessment & Plan:   Formatting of this note might be different from the original.  Has had hypertriglyceridemia on recent lipid panels, continue atorvastatin 10 mg daily and will follow-up with him on lab results.          Severe malnutrition ([address]) 5/12/2023 Yes    Elevated troponin level not due myocardial infarction 5/12/2023 Yes    Left foot drop 5/13/2023 Yes    Acute left-sided low back pain with left-sided sciatica 5/13/2023 Yes    Abnormal magnetic resonance imaging of lumbar spine 5/13/2023 Yes    Metastasis to bone ([facility] [address] 5/16/2023 Yes    Malignant neoplasm metastatic to both lungs ([facility] [address] 5/14/2023 Yes    Weakness of both lower extremities 5/14/2023 Yes    Compression of sacral nerve root 5/14/2023 Yes    Acute cystitis without hematuria 5/14/2023 Yes    Hyperglycemia 5/14/2023 Yes   Plan:     Acute small punctate infarcts in the left cerebral right cerebellar hemispheres  Plan to transition to Eliquis 5 mg twice daily. Continue Lipitor 40 mg daily  New metastatic carcinoma possibly of lung origin with spread to bone causing lower extremity weakness and foot drop  Spoke with Neurosurgery  on 5/16, recommend radiation therapy. Spoke with radiation oncologist, would like patient sent to [facility] for initiation of radiation  Oncology following, Will follow up on results of biopsy. Atrial fibrillation with RVR- now in NSR  Cardizem 120 mg daily, metoprolol 150 mg daily, started on sotalol with cardiology.    Started on Eliquis  Hyperglycemia likely due to steroid use  Add NPH to cover steroid effect, monitor for hypoglycemia   Hgb A1c was 6.1c  Thrombocytopenia   Resolved  History of CAD with EBER to LAD in 3/2021 has residual nonobstructive lesions in RCA and CX  Reviewed in care everywhere note from his cardiologist  Plavix is on hold for now, restart if okay with radiation oncology  COPD without exacerbation   Continue current treatment  Dyslipidemia : statin      Medical Decision Making: Medium    transfer  Lolita Staff,   5/17/2023  9:22 AM

## 2023-05-17 NOTE — PROGRESS NOTES
[facility] Neurology   IN-PATIENT SERVICE      NEUROLOGY PROGRESS  NOTE                Interval History:     No current complaints, resting comfortably in bed. Radiation oncology was consulted for recommendations last night. Awaiting biopsy results and possible treatment plan for transfer versus discharge to SNF  On Eliquis    History of Present Illness: The patient is a 74 y.o. male who presents with Hip Pain (Pt states he was at [facility] clinic to get a pain injection for his back when he fell, no LOC, and has pain in Lt hip 10/10, Pt on Blood thinners. ) and Tachycardia  . The patient was seen and examined and the chart was reviewed. Patient has had pain radiating down the left side of his leg and weakness as far as left foot drop for the last several months. MRI of the lumbar spine show evidence of metastatic disease around L5. Also abnormality in right lung apex with spiculated mass concerning for lung malignancy. Physical Exam:   /72   Pulse 65   Temp 97.9 °F (36.6 °C) (Oral)   Resp 20   Ht 5' 10\" (1.778 m)   Wt 153 lb 8 oz (69.6 kg)   SpO2 96%   BMI 22.02 kg/m²   Temp (24hrs), Av.1 °F (36.7 °C), Min:97.7 °F (36.5 °C), Max:98.6 °F (37 °C)      Neurological examination:     Mental status    Alert and oriented x 3; following all commands; speech is fluent. Cranial nerves    CN II - XII intact   Motor function  Strength: 5/5 RUE, 5/5 RLE, 5/5 LUE, 4/5 left iliopsoas, quadriceps. 3/5 left hamstrings. 1/5 dorsiflexion. 1/5 plantarflexion. Sensory function Intact to touch throughout      Cerebellar Intact finger-nose-finger testing. Intact heel-shin testing. Reflex function 1/4L LE patellar and Achilles.       Gait                  Not assessed                Diagnostics:      Laboratory Testing:  CBC:   Recent Labs     05/15/23  2315 23  0542 23  0455   WBC 17.8* 17.1* 19.3*   HGB 12.1* 12.1* 12.3*    202 209     BMP:    Recent Labs

## 2023-05-17 NOTE — DISCHARGE SUMMARY
[facility]  Office: [address], DO, Tadeo Ards, DO, Radames C[place], DO, Kenyon Vidhya Blood, DO, Bert Campuzano MD, Gerda Rosenberg MD, Ivette Lund MD, Bi Ward MD,  Dorene Foss MD, Flaca Carr MD, Beti Moreno, DO, Nia Robbins MD,  Eden Garcia MD, Tess Rodriguez MD, Tori Moritz, DO, Dalton Jones MD, Daniela Aguilar MD, Cecil Buerger, DO, Lloyd Hidalgo MD, Samy Diehl MD, Demarco Stiles MD, Lizabeth Mckeon MD,  José Yuan DO, Ernesto Diaz MD,  Rumalda Snellen, CNP,  Gunjan Anderson, CNP, Mike Aparicio, CNP, Serge Dukes, CNP,  Laura Awan, [facility], Vanita Garcia, CNP, Ronit Grady, CNP, Angelica Miller, CNP, Skylar Umana, CNP, Andrew Galeano, CNP, Saman Mark PA-C, Jun Samson, CNS, Paula Mccarty, CNP, Susanna Song, [facility]    Discharge Summary     Patient ID: Jonathan Mckinney  :  1950   MRN: [id number]     ACCOUNT:  [de-identified]   Patient's PCP: Nimesh Horton MD  Admit Date: 2023   Discharge Date: 2023     Length of Stay: 6  Code Status:  Full Code  Admitting Physician: Nia Robbins MD  Discharge Physician: Gaudencio Velasquez DO     Active Discharge Diagnoses:     Hospital Problem Lists:  Principal Problem:    Embolic stroke [facility])  Active Problems:    Compression of lumbar nerve root    Atrial fibrillation with RVR ([facility] [address]    Chronic obstructive pulmonary disease ([facility] [address]    Coronary artery disease involving native coronary artery of native heart without angina pectoris    Hyperlipidemia    Severe malnutrition (HCC)    Elevated troponin level not due myocardial infarction    Left foot drop    Acute left-sided low back pain with left-sided sciatica    Abnormal magnetic resonance imaging of lumbar spine    Metastasis to bone [facility])    Malignant neoplasm metastatic to both lungs (HCC)    Weakness of both lower extremities

## 2023-05-17 NOTE — PROGRESS NOTES
Writer spoke with [employer], patient has been assigned room 335 at [facility]. Report # [phone]. Assistance with transportation requested. Will await call from transportation company confirming pickup time.

## 2023-05-17 NOTE — PROGRESS NOTES
Dear Doctor,    Your patient's proton pump inhibitor (PPI) therapy was evaluated and was subsequently discontinued per [facility] approved policy. Because of the risk/benefit profile of these medications for our patients, a policy was developed to minimize the use of PPI medications in patients who do not appear to be high risk for clinically significant ulceration or bleeding. Many clinicians feel that the indications for Stress Ulcer Prophylaxis are as follows:  Coagulopathy (defined as platelet count <77,519 per m3, INR>1.5, or PTT>2 times the control value)  Mechanical ventilation for >48 hours  History of GI ulceration or bleeding within the past year  Traumatic brain injury, traumatic spinal cord injury, or burn injury  Two or more of the following minor criteria:  sepsis, ICU stay>1 week, occult GI bleeding for >6 days, or glucocorticoid therapy (more than 250mg hydrocortisone or the equivalent     Proton pump inhibitor (PPI) usage has been shown to be associated with nosocomial Clostridium difficile infection (CDI). The duration of PPI therapy is significantly associated with CDI. The probability for CDI appears higher when PPI use exceeded two (2) days in patients without a prior hospital admission and 1 day in patients with a prior admission. Additionally there may be an increased risk of pneumonia with the PPIs, although the data is far less consistent. If a patient is on PPI therapy and does not meet the above criteria (and is not excluded from review as defined by the policy),  PPI orders are discontinued per protocol. If this therapy was discontinued inappropriately and you feel that the order is necessary, we apologize for the inconvenience. Thank you.   Jeffery Beck, [facility], Rubi/PharmD  5/17/2023 3:02 PM

## 2023-05-17 NOTE — CARE COORDINATION
Ammon Work-Received phone ethel from [facility] asking if pt could go to SNF and be transported to and from [facility]. Phone call to [facility]. Transportation to and from radiation is not covered. Patient currently is having poor pain control and is a sarasteady to transfer. [facility] is also considering admitting pt into their hospital unit for radiation.  Rad:GUERO

## 2023-05-17 NOTE — PROGRESS NOTES
[employer] arrived to room to transfer patient to [facility], room 335. PIV still patent, to stay in during transport. Belonging collected from lockup box and given to spouse. Report called to [facility], spoke to [facility]. All questions answered at this time.

## 2023-05-17 NOTE — CONSULTS
[facility]            Radiation Oncology          [address] [place]: [phone]        F: [phone]       ImaCor             2023    Patient: Aidee Sinha   YOB: 1950       Dear Dr Bárbara Kaur,    Thank you for referring Aidee Sinha to me for evaluation. Below are the relevant portions of my assessment and plan of care. If you have questions, please do not hesitate to call me. I look forward to following this patient along with you. Sincerely,    Electronically signed by Maura Patten MD on 2023 at 7:51PM    CC: Patient Care Team:  Arron Loco MD as PCP - General (Internal Medicine Cardiovascular Disease)  ------------------------------------------------------------------------------------------------------------------------------------------------------------------------------------------      RADIATION ONCOLOGY NEW PATIENT VISIT:    Date of Service: 2023    Location:  [facility] Radiation Oncology,   [facility], [address] [place] [address]   [phone]     Patient ID:   Aidee Sinha  : 1950   MRN: [id number]    CHIEF COMPLAINT: \"Leg weakness\"    DIAGNOSIS:  Stage IV adenocarcinoma (pending pathology) with bone metastasis    HISTORY OF PRESENT ILLNESS:   Aidee Sinha is a 74 y.o. male with history of A-fib presented to the hospital after a fall with worsening back pain. Patient had seen chiropractor and his PCP referred to pain management for injections which did not help. Patient had CT imaging done in ED on May 11, 2023 which showed a right apical lung mass with interval pulmonary nodules as well as diffuse osseous lesions with fracture of the transverse process of L5 and subtle bilateral sacral ala fractures. Patient underwent a biopsy of the lung nodule on May 15, 2023 with read showing adenocarcinoma. Pathology is pending to determine origin.   Patient had Auto Differential    Reticulocytes    CBC with Auto Differential    Basic Metabolic Panel w/ Reflex to MG    Hemoglobin A1C    Electrophoresis Protein, Serum    Immunofixation,Blood    IMMUNOFIXATION URINE RANDOM PROFILE    Kappa/Lambda Quantitative Free Light Chains, Serum    BETA 2 MICROGLOBULIN, SERUM    PSA, Diagnostic    Beta-2-Microglobulin Random Urine    SURGICAL PATHOLOGY REPORT    SURGICAL PATHOLOGY REPORT    CBC    Basic Metabolic Panel    CBC    APTT    Protime-INR    CBC    ADULT DIET; Regular; Low Fat/Low Chol/High Fiber/2 gm Na; No Added Salt (3-4 gm)    HYPOGLYCEMIA TREATMENT: blood glucose LESS THAN 70 mg/dL and patient ALERT and TOLERATING PO    HYPOGLYCEMIA TREATMENT: blood glucose LESS THAN 70 mg/dL and patient NOT ALERT or NPO    Vital signs per unit routine    Up with assistance    Daily weights    Intake and output    Neurovascular checks    Telemetry monitoring - 72 hour duration    Turn or assist with turn approximately every 2 hours if patient is unable to turn self.  Remind patient to turn if necessary    Maintain HOB at the lowest elevation consistent with medical plan of care    Assess skin per unit guidelines    Maintain heels off of bed at all times    Pad/offload medical devices    Use lift equipment for lifting patient    Activity as tolerated    Full Code    Inpatient consult to Hospitalist    Inpatient consult to Cardiology    Inpatient Consult to Neurology    Inpatient consult to Oncology    Inpatient consult to Tatianna; Breakfast, Dinner; Standard High Calorie/High Protein Oral Supplement    OT eval and treat    PT eval and treat    POCT glucose    POCT Glucose    POC Glucose Fingerstick    POC Glucose Fingerstick    POC Glucose Fingerstick    POC Glucose Fingerstick    POC Glucose Fingerstick    POC Glucose Fingerstick    POC Glucose Fingerstick    POC Glucose Fingerstick    POC Glucose Fingerstick    POC Glucose Fingerstick    POC

## 2023-05-17 NOTE — CONSULTS
Today's Date: 5/17/2023  Patient Name: Demetrius Mitchell  Date of admission: 5/17/2023  5:09 PM  Patient's age: 74 y. o., 1950  Admission Dx: Cord compression ([address]) [G95.20]  Spinal cord compression ([address]) [G95.20]    Reason for Consult: management recommendations  Requesting Physician: Laya Osorio MD    CHIEF COMPLAINT: Metastatic cancer. Lower extremity weakness. History Obtained From:  patient, electronic medical record    HISTORY OF PRESENT ILLNESS:      The patient is a 74 y.o.  male who is Transferred from [facility].  Patient was initially admitted for evaluation after a fall. Work-up revealed atrial fibrillation with RVR. Also noted to have bilateral lower extremity weakness underwent further work-up including MRI. MRI shows bony metastasis with paraspinal soft tissue extension. MRI of cervical spine thoracic spine also showed diffuse metastasis. CT chest abdomen pelvis showed spiculated right apical mass concerning for neoplasm initially there were innumerable scattered pulmonary nodules as well as osteolytic process involving sacrum bilateral sacral dennis and nondisplaced fractures of transverse process. MRI brain showed tiny acute ischemic infarcts along with right cerebral and left frontal parietal occipital territories. Lower extremity weakness thought to be secondary to radiculopathy from tumor extension neurosurgery was consulted and they recommended radiation. Patient is subsequently transferred to [facility] for radiation. Patient underwent biopsy of the sacrum which was metastatic adenocarcinoma poorly differentiated. Based on IHC and clinical imaging lung is likely primary.   Patient has a smoking history      Past Medical History:   has a past medical history of A-fib ([address]), Anxiety, Cardiomyopathy ([address]), Chronic obstructive pulmonary disease ([address]), Coronary artery disease involving native coronary artery of native heart without angina pectoris, and reactive, extraocular eye movements intact   Ears - bilateral TM's and external ear canals normal   Mouth - mucous membranes moist, pharynx normal without lesions   Neck - supple, no significant adenopathy   Lymphatics - no palpable lymphadenopathy, no hepatosplenomegaly   Chest - clear to auscultation, no wheezes, rales or rhonchi, symmetric air entry   Heart - normal rate, regular rhythm, normal S1, S2, no murmurs  Abdomen - soft, nontender, nondistended, no masses or organomegaly   Neurological - alert, oriented, normal speech, positive for lower extremity weakness  Musculoskeletal - no joint tenderness, deformity or swelling   Extremities - peripheral pulses normal, no pedal edema, no clubbing or cyanosis   Skin - normal coloration and turgor, no rashes, no suspicious skin lesions noted ,      DATA:      Labs:     Results for orders placed or performed during the hospital encounter of 05/11/23   CBC with Auto Differential   Result Value Ref Range    WBC 16.0 (H) 3.5 - 11.3 k/uL    RBC 4.34 4.21 - 5.77 m/uL    Hemoglobin 14.0 13.0 - 17.0 g/dL    Hematocrit 41.5 40.7 - 50.3 %    MCV 95.6 82.6 - 102.9 fL    MCH 32.3 25.2 - 33.5 pg    MCHC 33.7 28.4 - 34.8 g/dL    RDW 13.9 11.8 - 14.4 %    Platelets 935 880 - 404 k/uL    MPV 9.9 8.1 - 13.5 fL    NRBC Automated 0.0 0.0 per 100 WBC    Seg Neutrophils 64 36 - 65 %    Lymphocytes 26 24 - 43 %    Monocytes 7 3 - 12 %    Eosinophils % 2 1 - 4 %    Basophils 0 0 - 2 %    Immature Granulocytes 1 (H) 0 %    Segs Absolute 10.26 (H) 1.50 - 8.10 k/uL    Absolute Lymph # 4.16 (H) 1.10 - 3.70 k/uL    Absolute Mono # 1.11 0.10 - 1.20 k/uL    Absolute Eos # 0.30 0.00 - 0.44 k/uL    Basophils Absolute 0.04 0.00 - 0.20 k/uL    Absolute Immature Granulocyte 0.09 0.00 - 0.30 k/uL   Basic Metabolic Panel   Result Value Ref Range    Glucose 201 (H) 70 - 99 mg/dL    BUN 21 8 - 23 mg/dL    Creatinine 0.76 0.70 - 1.20 mg/dL    Est, Glom Filt Rate >60 >60 mL/min/1.73m2    Bun/Cre Ratio 28 Metastatic lung cancer    RECOMMENDATIONS:  I reviewed the labs/imaging available to me,outside records and discussed with the patient. I explained to the patient the nature of this problem. I explained the significance of these abnormalities and possible etiology and management options  Reviewed hospitalization course  Reviewed results of imaging study with the patient  Discussed results of path report most likely consistent with metastatic lung cancer. Patient does have history of tobacco use. Recommend NGS testing to identify an outpatient  Agree with radiation for cord compression  Continue Decadron  Continue symptomatic supportive care  Okay to give anticoagulation. No brain mets noted. Symptomatic and supportive care        Discussed with patient and Nurse. Thank you for asking us to see this patient. Gayla Granger MD          This note is created with the assistance of a speech recognition program.  While intending to generate a document that actually reflects the content of the visit, the document can still have some errors including those of syntax and sound a like substitutions which may escape proof reading. It such instances, actual meaning can be extrapolated by contextual diversion.

## 2023-05-18 ENCOUNTER — TELEPHONE (OUTPATIENT)
Dept: CASE MANAGEMENT | Age: 73
End: 2023-05-18

## 2023-05-18 ENCOUNTER — HOSPITAL ENCOUNTER (OUTPATIENT)
Dept: RADIATION ONCOLOGY | Age: 73
Discharge: HOME | End: 2023-05-18

## 2023-05-18 LAB
ANION GAP SERPL CALCULATED.3IONS-SCNC: 10 MMOL/L (ref 9–17)
BASOPHILS # BLD: 0 K/UL (ref 0–0.2)
BASOPHILS NFR BLD: 0 % (ref 0–2)
BUN SERPL-MCNC: 23 MG/DL (ref 8–23)
CALCIUM SERPL-MCNC: 8.9 MG/DL (ref 8.6–10.4)
CHLORIDE SERPL-SCNC: 103 MMOL/L (ref 98–107)
CO2 SERPL-SCNC: 24 MMOL/L (ref 20–31)
CREAT SERPL-MCNC: 0.51 MG/DL (ref 0.7–1.2)
EKG ATRIAL RATE: 51 BPM
EKG P AXIS: 54 DEGREES
EKG P-R INTERVAL: 136 MS
EKG Q-T INTERVAL: 490 MS
EKG QRS DURATION: 82 MS
EKG QTC CALCULATION (BAZETT): 451 MS
EKG R AXIS: 15 DEGREES
EKG T AXIS: 45 DEGREES
EKG VENTRICULAR RATE: 51 BPM
EOSINOPHIL # BLD: 0 K/UL (ref 0–0.4)
EOSINOPHILS RELATIVE PERCENT: 0 % (ref 1–4)
ERYTHROCYTE [DISTWIDTH] IN BLOOD BY AUTOMATED COUNT: 14.5 % (ref 12.5–15.4)
GFR SERPL CREATININE-BSD FRML MDRD: >60 ML/MIN/1.73M2
GLUCOSE BLD-MCNC: 163 MG/DL (ref 75–110)
GLUCOSE BLD-MCNC: 173 MG/DL (ref 75–110)
GLUCOSE BLD-MCNC: 214 MG/DL (ref 75–110)
GLUCOSE BLD-MCNC: 288 MG/DL (ref 75–110)
GLUCOSE SERPL-MCNC: 151 MG/DL (ref 70–99)
HCT VFR BLD AUTO: 39.6 % (ref 41–53)
HGB BLD-MCNC: 13.1 G/DL (ref 13.5–17.5)
LYMPHOCYTES # BLD: 7 % (ref 24–44)
LYMPHOCYTES NFR BLD: 1.2 K/UL (ref 1–4.8)
MCH RBC QN AUTO: 31.9 PG (ref 26–34)
MCHC RBC AUTO-ENTMCNC: 33.1 G/DL (ref 31–37)
MCV RBC AUTO: 96.2 FL (ref 80–100)
MONOCYTES NFR BLD: 0.51 K/UL (ref 0.1–0.8)
MONOCYTES NFR BLD: 3 % (ref 1–7)
MORPHOLOGY: NORMAL
NEUTROPHILS NFR BLD: 90 % (ref 36–66)
NEUTS SEG NFR BLD: 15.39 K/UL (ref 1.8–7.7)
PLATELET # BLD AUTO: 214 K/UL (ref 140–450)
PMV BLD AUTO: 8.5 FL (ref 6–12)
POTASSIUM SERPL-SCNC: 4.5 MMOL/L (ref 3.7–5.3)
RBC # BLD AUTO: 4.12 M/UL (ref 4.5–5.9)
SODIUM SERPL-SCNC: 137 MMOL/L (ref 135–144)
WBC OTHER # BLD: 17.1 K/UL (ref 3.5–11)

## 2023-05-18 PROCEDURE — 36415 COLL VENOUS BLD VENIPUNCTURE: CPT

## 2023-05-18 PROCEDURE — 82947 ASSAY GLUCOSE BLOOD QUANT: CPT

## 2023-05-18 PROCEDURE — 6370000000 HC RX 637 (ALT 250 FOR IP): Performed by: STUDENT IN AN ORGANIZED HEALTH CARE EDUCATION/TRAINING PROGRAM

## 2023-05-18 PROCEDURE — 2580000003 HC RX 258: Performed by: STUDENT IN AN ORGANIZED HEALTH CARE EDUCATION/TRAINING PROGRAM

## 2023-05-18 PROCEDURE — 1210000000 HC MED SURG R&B

## 2023-05-18 PROCEDURE — 77295 3-D RADIOTHERAPY PLAN: CPT | Performed by: RADIOLOGY

## 2023-05-18 PROCEDURE — 6360000002 HC RX W HCPCS: Performed by: STUDENT IN AN ORGANIZED HEALTH CARE EDUCATION/TRAINING PROGRAM

## 2023-05-18 PROCEDURE — 77334 RADIATION TREATMENT AID(S): CPT | Performed by: RADIOLOGY

## 2023-05-18 PROCEDURE — 77290 THER RAD SIMULAJ FIELD CPLX: CPT | Performed by: RADIOLOGY

## 2023-05-18 PROCEDURE — 77412 RADIATION TX DELIVERY LVL 3: CPT | Performed by: RADIOLOGY

## 2023-05-18 PROCEDURE — 77300 RADIATION THERAPY DOSE PLAN: CPT | Performed by: RADIOLOGY

## 2023-05-18 PROCEDURE — 77336 RADIATION PHYSICS CONSULT: CPT | Performed by: RADIOLOGY

## 2023-05-18 PROCEDURE — 77417 THER RADIOLOGY PORT IMAGE(S): CPT | Performed by: RADIOLOGY

## 2023-05-18 PROCEDURE — 85025 COMPLETE CBC W/AUTO DIFF WBC: CPT

## 2023-05-18 PROCEDURE — 99232 SBSQ HOSP IP/OBS MODERATE 35: CPT | Performed by: STUDENT IN AN ORGANIZED HEALTH CARE EDUCATION/TRAINING PROGRAM

## 2023-05-18 PROCEDURE — 99232 SBSQ HOSP IP/OBS MODERATE 35: CPT | Performed by: INTERNAL MEDICINE

## 2023-05-18 PROCEDURE — 80048 BASIC METABOLIC PNL TOTAL CA: CPT

## 2023-05-18 RX ORDER — METOPROLOL SUCCINATE 25 MG/1
25 TABLET, EXTENDED RELEASE ORAL DAILY
Status: DISCONTINUED | OUTPATIENT
Start: 2023-05-18 | End: 2023-05-19

## 2023-05-18 RX ORDER — LORAZEPAM 2 MG/ML
1 INJECTION INTRAMUSCULAR ONCE
Status: COMPLETED | OUTPATIENT
Start: 2023-05-18 | End: 2023-05-18

## 2023-05-18 RX ADMIN — SODIUM CHLORIDE, PRESERVATIVE FREE 10 ML: 5 INJECTION INTRAVENOUS at 08:50

## 2023-05-18 RX ADMIN — ACETAMINOPHEN 650 MG: 325 TABLET ORAL at 13:12

## 2023-05-18 RX ADMIN — ATORVASTATIN CALCIUM 40 MG: 40 TABLET, FILM COATED ORAL at 21:14

## 2023-05-18 RX ADMIN — HYDROCODONE BITARTRATE AND ACETAMINOPHEN 1 TABLET: 5; 325 TABLET ORAL at 08:49

## 2023-05-18 RX ADMIN — DEXAMETHASONE SODIUM PHOSPHATE 4 MG: 4 INJECTION, SOLUTION INTRAMUSCULAR; INTRAVENOUS at 01:17

## 2023-05-18 RX ADMIN — DEXAMETHASONE SODIUM PHOSPHATE 4 MG: 4 INJECTION, SOLUTION INTRAMUSCULAR; INTRAVENOUS at 18:44

## 2023-05-18 RX ADMIN — DEXAMETHASONE SODIUM PHOSPHATE 4 MG: 4 INJECTION, SOLUTION INTRAMUSCULAR; INTRAVENOUS at 13:00

## 2023-05-18 RX ADMIN — SODIUM CHLORIDE, PRESERVATIVE FREE 10 ML: 5 INJECTION INTRAVENOUS at 21:18

## 2023-05-18 RX ADMIN — DEXAMETHASONE SODIUM PHOSPHATE 4 MG: 4 INJECTION, SOLUTION INTRAMUSCULAR; INTRAVENOUS at 06:42

## 2023-05-18 RX ADMIN — HYDROCODONE BITARTRATE AND ACETAMINOPHEN 1 TABLET: 5; 325 TABLET ORAL at 21:15

## 2023-05-18 RX ADMIN — APIXABAN 5 MG: 5 TABLET, FILM COATED ORAL at 21:15

## 2023-05-18 RX ADMIN — APIXABAN 5 MG: 5 TABLET, FILM COATED ORAL at 08:49

## 2023-05-18 RX ADMIN — INSULIN LISPRO 2 UNITS: 100 INJECTION, SOLUTION INTRAVENOUS; SUBCUTANEOUS at 13:00

## 2023-05-18 RX ADMIN — LORAZEPAM 1 MG: 2 INJECTION INTRAMUSCULAR; INTRAVENOUS at 13:11

## 2023-05-18 ASSESSMENT — PAIN DESCRIPTION - PAIN TYPE
TYPE: ACUTE PAIN;CHRONIC PAIN

## 2023-05-18 ASSESSMENT — PAIN DESCRIPTION - LOCATION
LOCATION: ABDOMEN
LOCATION: BACK
LOCATION: ABDOMEN
LOCATION: BACK
LOCATION: HIP

## 2023-05-18 ASSESSMENT — PAIN SCALES - GENERAL
PAINLEVEL_OUTOF10: 2
PAINLEVEL_OUTOF10: 2
PAINLEVEL_OUTOF10: 5
PAINLEVEL_OUTOF10: 0
PAINLEVEL_OUTOF10: 0
PAINLEVEL_OUTOF10: 2

## 2023-05-18 ASSESSMENT — PAIN DESCRIPTION - ORIENTATION
ORIENTATION: MID
ORIENTATION: LEFT
ORIENTATION: MID
ORIENTATION: MID
ORIENTATION: LEFT

## 2023-05-18 ASSESSMENT — PAIN DESCRIPTION - FREQUENCY
FREQUENCY: INTERMITTENT

## 2023-05-18 ASSESSMENT — PAIN DESCRIPTION - DESCRIPTORS
DESCRIPTORS: SHARP
DESCRIPTORS: SHARP

## 2023-05-18 ASSESSMENT — PAIN DESCRIPTION - ONSET
ONSET: ON-GOING

## 2023-05-18 NOTE — TELEPHONE ENCOUNTER
Name: Frantz Douglas  : 1950  MRN: [id number]    Oncology Navigation- Initial Note:  (Unknown))  Navigator received assignment and adding self to care team. Pt. Transferred to [facility], needing, palliative RTX. Dr. Rocio Sutton has already seen pt. Today and possible RTX today. Pt. May need NGS testing as outpt? May need palliative care input? Plan to reach out to pt.  When appropriate  Electronically signed by Karan Ag RN on 2023 at 10:39 AM

## 2023-05-18 NOTE — PLAN OF CARE
Nutrition Problem #1: Predicted inadequate energy intake  Intervention: Food and/or Nutrient Delivery: Continue Current Diet, Start Oral Nutrition Supplement  Nutritional Goal: At least 75% EER prior to discharge

## 2023-05-18 NOTE — CARE COORDINATION
Social work: Patient transferred from [facility] to [facility] for inpatient radiation treatment-2 weeks needed. Spoke to Marie/GUERO at [facility], states [facility] can accept patient, however, patient cannot have PET scan, chemo or radiation while at the facility. Could also look at snf placement, however, patient would have to be able to safely transport to/from radiation in wheelchair as ambulance transport would be snf responsibility for cost.  At [facility], patient was max x2 assist for bed mobility. Continue to follow.

## 2023-05-18 NOTE — PROGRESS NOTES
Patient here for simulation. Per Dr. Taisha Juarez, planning to start his first of 10 radiation treatments to his lumbar/sacral spine. Pt signed consent for this with Dr. Taisha Juarez prior to arrival.  Patient tolerated well. Will continue to follow.

## 2023-05-18 NOTE — PROGRESS NOTES
Today's Date: 5/18/2023  Patient Name: Sangita Ureña  Date of admission: 5/17/2023  5:09 PM  Patient's age: 74 y. o., 1950  Admission Dx: Cord compression ([address]) [G95.20]  Spinal cord compression ([address]) [G95.20]    Reason for Consult: management recommendations  Requesting Physician: Jayme Roberts MD    CHIEF COMPLAINT: Metastatic cancer. Lower extremity weakness. Interim history  The patient is seen and evaluated. He is sleepy after receiving muscle relaxers. He was at radiation oncology and underwent simulation. Plan to start radiation this afternoon. HISTORY OF PRESENT ILLNESS:      The patient is a 74 y.o.  male who is Transferred from [facility].  Patient was initially admitted for evaluation after a fall. Work-up revealed atrial fibrillation with RVR. Also noted to have bilateral lower extremity weakness underwent further work-up including MRI. MRI shows bony metastasis with paraspinal soft tissue extension. MRI of cervical spine thoracic spine also showed diffuse metastasis. CT chest abdomen pelvis showed spiculated right apical mass concerning for neoplasm initially there were innumerable scattered pulmonary nodules as well as osteolytic process involving sacrum bilateral sacral dennis and nondisplaced fractures of transverse process. MRI brain showed tiny acute ischemic infarcts along with right cerebral and left frontal parietal occipital territories. Lower extremity weakness thought to be secondary to radiculopathy from tumor extension neurosurgery was consulted and they recommended radiation. Patient is subsequently transferred to [facility] for radiation. Patient underwent biopsy of the sacrum which was metastatic adenocarcinoma poorly differentiated. Based on IHC and clinical imaging lung is likely primary.   Patient has a smoking history      Past Medical History:   has a past medical history of A-fib ([address]), Anxiety, Cardiomyopathy ([address]), Chronic obstructive pulmonary disease ([address]), Coronary artery disease involving native coronary artery of native heart without angina pectoris, and Hyperlipidemia. Past Surgical History:   has a past surgical history that includes Cardioversion; Colonoscopy; Cardiac catheterization; Appendectomy; and CT BIOPSY DEEP BONE PERCUTANEOUS (5/15/2023). Medications:    Prior to Admission medications    Medication Sig Start Date End Date Taking?  Authorizing Provider   lisinopril (PRINIVIL;ZESTRIL) 5 MG tablet 1 tablet Orally Once a day    Historical Provider, MD   atorvastatin (LIPITOR) 40 MG tablet 1 tablet Orally Once a day 3/22/23   Historical Provider, MD   apixaban (ELIQUIS) 5 MG TABS tablet Take 1 tablet by mouth 2 times daily 12/5/22   Historical Provider, MD   clopidogrel (PLAVIX) 75 MG tablet Take 1 tablet by mouth daily 3/29/23   Historical Provider, MD   metoprolol succinate (TOPROL XL) 100 MG extended release tablet 1 tablet Orally Once a day 1/12/23   Historical Provider, MD   spironolactone (ALDACTONE) 25 MG tablet Take 0.5 tablets by mouth daily 3/29/23   Historical Provider, MD   Multiple Vitamins-Minerals (THERAPEUTIC MULTIVITAMIN-MINERALS) tablet Take 1 tablet by mouth daily    Historical Provider, MD     Current Facility-Administered Medications   Medication Dose Route Frequency Provider Last Rate Last Admin    metoprolol succinate (TOPROL XL) extended release tablet 25 mg  25 mg Oral Daily Grace Seen, MD        dexamethasone (DECADRON) injection 4 mg  4 mg IntraVENous Q6H Grace Seen, MD   4 mg at 05/18/23 1300    sodium chloride flush 0.9 % injection 5-40 mL  5-40 mL IntraVENous 2 times per day Grace Seen, MD   10 mL at 05/18/23 0850    sodium chloride flush 0.9 % injection 5-40 mL  5-40 mL IntraVENous PRN Grace Seen, MD        0.9 % sodium chloride infusion   IntraVENous PRN Grace Seen, MD        ondansetron (ZOFRAN-ODT) disintegrating tablet 4 mg  4 mg Oral Q8H PRN Grace Seen, MD        Or supportive care  Discussed with the wife prognosis and systemic therapy after radiation if they are interested. She will consider and discuss with the rest of the family  The patient has incurable disease and his prognosis is poor          Discussed with patient and Nurse. Thank you for asking us to see this patient. Abby Chan MD  Hematologist/Medical [id number] [facility] hematology oncology physicians                This note is created with the assistance of a speech recognition program.  While intending to generate a document that actually reflects the content of the visit, the document can still have some errors including those of syntax and sound a like substitutions which may escape proof reading. It such instances, actual meaning can be extrapolated by contextual diversion.

## 2023-05-18 NOTE — PROGRESS NOTES
[facility]  PROGRESS NOTE    Room # 335/335-01   Name: Adriana Valladares            Presybeterian: Zoroastrian     Reason for visit: Routine    I visited the family. Admit Date & Time: 5/17/2023  5:09 PM    Assessment:  Adriana Valladares is a 74 y.o. male in the hospital. Upon entering the room Writer observed Patient and Spouse talking with MD about Patient's condition and plan of care. Patient was taken in his bed by the radiation oncology team to the radiation clinic. Spouse exited the room and continued talking with MD about Patient's condition. Spouse continued conversing with writer. She acknowledged the factors contributing to Patient's illness and frustrations about the time it took to diagnose him. She shared about a significant loss in their family and how it has impacted her and Pt. She identified her youngest son as the child who is most supportive. She identified as Zoroastrian and noted that they have not been to their Gnosticist recently. She asked if writer knows a retired  who worked in the system. She gave writer permission to contact this former  and tell them that Spouse is open to a phone call. Intervention:  I introduced myself and my title as . Writer inquired how Spouse was doing. Writer offered supportive presence and active listening. Writer empathized with Spouse's loss. Writer asked about Spouse's and Pt's sources of support and strength. Writer took [employer] number and told her she would try to contact the  Spouse requested. Outcome:  Spouse thanked writer. Plan:  Chaplains will remain available to offer spiritual and emotional support as needed. 05/18/23 1057   Encounter Summary   Service Provided For: Family   Referral/Consult From: [facility] Family members; Children;Spouse   Last Encounter  05/18/23   Complexity of Encounter Moderate   Begin Time 1030   End Time  1045   Total Time Calculated 15

## 2023-05-18 NOTE — PLAN OF CARE
Problem: Safety - Adult  Goal: Free from fall injury  Outcome: Progressing     Problem: Pain  Goal: Verbalizes/displays adequate comfort level or baseline comfort level  Outcome: Progressing     Problem: ABCDS Injury Assessment  Goal: Absence of physical injury  Outcome: Progressing  Flowsheets (Taken 5/17/2023 1738 by Lois Negro RN)  Absence of Physical Injury: Implement safety measures based on patient assessment     Problem: Skin/Tissue Integrity  Goal: Absence of new skin breakdown  Description: 1. Monitor for areas of redness and/or skin breakdown  2. Assess vascular access sites hourly  3. Every 4-6 hours minimum:  Change oxygen saturation probe site  4. Every 4-6 hours:  If on nasal continuous positive airway pressure, respiratory therapy assess nares and determine need for appliance change or resting period.   Outcome: Progressing     Problem: Pain  Goal: Verbalizes/displays adequate comfort level or baseline comfort level  Outcome: Progressing     Problem: ABCDS Injury Assessment  Goal: Absence of physical injury  Outcome: Progressing  Flowsheets (Taken 5/17/2023 1738 by Lois Negro RN)  Absence of Physical Injury: Implement safety measures based on patient assessment     Problem: Chronic Conditions and Co-morbidities  Goal: Patient's chronic conditions and co-morbidity symptoms are monitored and maintained or improved  Outcome: Not Progressing  Flowsheets  Taken 5/17/2023 2000 by Logan Medina 34 - Patient's Chronic Conditions and Co-Morbidity Symptoms are Monitored and Maintained or Improved: Monitor and assess patient's chronic conditions and comorbid symptoms for stability, deterioration, or improvement  Taken 5/17/2023 1712 by Logan Huynh 34 - Patient's Chronic Conditions and Co-Morbidity Symptoms are Monitored and Maintained or Improved: Monitor and assess patient's chronic conditions and comorbid symptoms for stability, deterioration, or improvement

## 2023-05-18 NOTE — PROGRESS NOTES
Patient seen on the floor today after being transferred to [facility].  Patient was reviewed that his pathology did confirm a poorly differentiated adenocarcinoma of lung origin. Given the histology, patient is recommended to begin a course of palliative radiation to his cervical spine to help with his sacral spinal nerve compression. Patient was reviewed treatment planning and side effects. He provided informed consent to proceed with scheduling a treatment planning appointment for this morning for likely followed by his first treatment later today. Patient is encouraged to continue using his pain medication and working with PT/OT.

## 2023-05-18 NOTE — PROGRESS NOTES
[facility]  Office: [address], DO, Odilia Mcintosh, DO, Gil Quinones, DO, Esther Dani Blood, DO, Lito Mendosa MD, Rachid Brown MD, Modesto Diaz MD, Shubham Wen MD,  Scarlet Duval MD, Larry Langley MD, Doris Doran, DO, Mayur Arboleda MD,  Constantine Stark MD, Florina Grayson MD, Lyle Butler, DO, Mary Ramon MD, Meron Melvin MD, Macie Rosenberg, DO, Mohan Velez MD, Andre Boyle MD, Arminda Meyer MD, Leah Gongora MD,  Blaise Lowery, DO, Shadia Zhang MD,  Clive Landis CNP,  Cleveland Bass, CNP, Craig Sever, CNP, Rodo Vega, CNP,  Desiree Ware, DNP, Abhijeet Glasgow, CNP, Leela Burkett, CNP, Zoie Bhatia, CNP, Annette Hector, CNP, Kirt Dolan, CNP, SKY Lyn-C, Ingris Burgos, CNS, Patty Villeda, CNP, [facility], [address] [place] [address]    Progress Note    5/18/2023    2:25 PM    Name:   Felicia Pollard  MRN:     [id number]     Acct:      [de-identified]   Room:   [address] [place] [address] Day:  1  Admit Date:  5/17/2023  5:09 PM    PCP:   Sherwood Mcburney, MD  Code Status:  DNR-CCA    Subjective:     Patient was seen and examined at bedside this AM. He reports feeling well and is in good spirits. Pathology from the sacral biopsy resulted as a poorly differentiated adenocarcinoma of the lung primary. Unfortunately, the patient has extensive metastatic disease and his prognosis is poor. Palliative care and oncology are following; appreciate recommendations. Will discuss prognosis and goals of care with family. Radiation oncology is following; plan to start radiation treatments this afternoon. Medications:      Allergies:  No Known Allergies    Current Meds:   Scheduled Meds:    metoprolol succinate  25 mg Oral Daily    dexamethasone  4 mg IntraVENous Q6H    sodium chloride flush  5-40 mL IntraVENous 2 times per day    apixaban  5 mg Oral BID    atorvastatin  40 mg Oral Nightly    insulin murmur  Abdomen:  soft, nontender, nondistended, normal bowel sounds, no masses, hepatomegaly, splenomegaly  Extremities:  Left leg weakness. Skin:  no gross lesions, rashes, induration    Assessment:     Hospital Problems             Last Modified POA    * (Principal) Cord compression ([address]) 5/17/2023 Yes    Atrial fibrillation with RVR ([address]) 5/17/2023 Yes    Cardiomyopathy ([address]) (Chronic) 5/17/2023 Yes    Overview Signed 5/11/2023  4:44 PM by ANGELA Gunter CNP     Formatting of this note might be different from the original.  Mixed ischemic and nonischemic (secondary to atrial fibrillation)    Last Assessment & Plan:   Formatting of this note might be different from the original.  No symptoms of heart failure or volume overload. Will get an echocardiogram in 3 months to ensure his EF has normalized which I would not be surprised as long as he maintains sinus rhythm. Chronic obstructive pulmonary disease (HCC) (Chronic) 5/17/2023 Yes    Essential hypertension 5/17/2023 Yes    Overview Signed 5/11/2023  4:44 PM by ANGELA Gunter CNP     Last Assessment & Plan:   Formatting of this note might be different from the original.  Well controlled on present regimen.          Severe malnutrition ([address]) 5/17/2023 Yes    Metastasis to bone ([address]) 5/17/2023 Yes    Malignant neoplasm metastatic to both lungs ([address]) 5/17/2023 Yes    Spinal cord compression ([address]) 5/17/2023 Yes       Plan:     Cord compression   -Secondary to Stage IV cancer of lung primary   -MRI lumbar spine showing numerous enhancing osseous lesions with encroachment of the sacral spinal canal and left L5 and S1 neural foramina   -Radiation oncology following; plan to start inpatient palliative radiation treatments in the hope of improving the patient's mobility   -Pathology showing adenocarcinoma of lung primary   -Dexamethasone 4 mg q6h      Stage IV adenocarcinoma of the lung   -Patient has extensive metastatic disease and prognosis is

## 2023-05-18 NOTE — PROGRESS NOTES
SBAR report received from ongoing nurse, Dc Young. Pt  alert and oriented at the time of handoff. Pt monitoring in sinus Bradycardia however remains asymptomatic. on the cardiac monitor. Complains of leg pain noted. PRN pain med to be given with PM meds.  Will continue to monitor

## 2023-05-18 NOTE — PROGRESS NOTES
Comprehensive Nutrition Assessment    Type and Reason for Visit:  Reassess    Nutrition Recommendations/Plan:   Continue current diet, encourage PO intake  ONS Ensure Enlive BID  Monitor weight, intake, labs, skin     Malnutrition Assessment:  Malnutrition Status:  Insufficient data (unable to complete NFPE d/t pt off floor) (05/18/23 1101)    Context:  Chronic Illness     Findings of the 6 clinical characteristics of malnutrition:  Energy Intake:  75% or less estimated energy requirements for 1 month or longer  Weight Loss:  Greater than 10% over 6 months     Body Fat Loss:  Unable to assess     Muscle Mass Loss:  Unable to assess    Fluid Accumulation:  Unable to assess     Strength:  Not Performed    Nutrition Assessment:    Pt transferred to [facility] for palliative radiation. PNS recieved. Pt currently off floor for radiation tx. Noted pt was receiving ONS at [address], will reorder. PO intake 1-25%. No significat weight loss per EMR hx, but previous RDN note states 40# loss over the last 2 months. Low BMI noted. Per rounds, plan for pt to stay until palliative radiation tx complete. Nutrition Related Findings:    No edema noted. Cr 0.51 (L), Glu 173 (H). All other labs/meds reviewed. Wound Type: None       Current Nutrition Intake & Therapies:    Average Meal Intake: 1-25%  Average Supplements Intake: None Ordered  ADULT DIET;  Regular    Anthropometric Measures:  Height: 5' 10\" (177.8 cm)  Ideal Body Weight (IBW): 166 lbs (75 kg)    Current Body Weight: 157 lb 6.5 oz (71.4 kg), 94.8 %   Current BMI (kg/m2): 22.6  BMI Categories: Normal Weight (BMI 22.0 to 24.9) age over 72    Estimated Daily Nutrient Needs:  Energy Requirements Based On: Formula  Weight Used for Energy Requirements: Current  Energy (kcal/day): 0038-8559 kcal/day (AF 1.3, SF 1-1.1)  Weight Used for Protein Requirements: Ideal  Protein (g/day): 113-150 g/day (1.5-2.0 g/kg IBW)  Method Used for Fluid Requirements: 1 ml/kcal  Fluid

## 2023-05-19 ENCOUNTER — TELEPHONE (OUTPATIENT)
Dept: CASE MANAGEMENT | Age: 73
End: 2023-05-19

## 2023-05-19 VITALS
HEIGHT: 70 IN | RESPIRATION RATE: 16 BRPM | OXYGEN SATURATION: 96 % | TEMPERATURE: 97.9 F | SYSTOLIC BLOOD PRESSURE: 138 MMHG | BODY MASS INDEX: 22.38 KG/M2 | WEIGHT: 156.31 LBS | HEART RATE: 91 BPM | DIASTOLIC BLOOD PRESSURE: 85 MMHG

## 2023-05-19 LAB
ANION GAP SERPL CALCULATED.3IONS-SCNC: 12 MMOL/L (ref 9–17)
BASOPHILS # BLD: 0 K/UL (ref 0–0.2)
BASOPHILS NFR BLD: 0 % (ref 0–2)
BETA 2 MICROGLOBULIN UG/G CRT: 77 UG/G CRT (ref 0–300)
BETA-2 MICROGLOB.,U: 66 UG/L (ref 0–300)
BUN SERPL-MCNC: 25 MG/DL (ref 8–23)
CALCIUM SERPL-MCNC: 9.1 MG/DL (ref 8.6–10.4)
CHLORIDE SERPL-SCNC: 100 MMOL/L (ref 98–107)
CO2 SERPL-SCNC: 24 MMOL/L (ref 20–31)
CREAT SERPL-MCNC: 0.47 MG/DL (ref 0.7–1.2)
CREATININE URINE /VOLUME: 86 MG/DL
EOSINOPHIL # BLD: 0 K/UL (ref 0–0.4)
EOSINOPHILS RELATIVE PERCENT: 0 % (ref 1–4)
ERYTHROCYTE [DISTWIDTH] IN BLOOD BY AUTOMATED COUNT: 14.3 % (ref 12.5–15.4)
GFR SERPL CREATININE-BSD FRML MDRD: >60 ML/MIN/1.73M2
GLUCOSE BLD-MCNC: 151 MG/DL (ref 75–110)
GLUCOSE BLD-MCNC: 165 MG/DL (ref 75–110)
GLUCOSE BLD-MCNC: 212 MG/DL (ref 75–110)
GLUCOSE SERPL-MCNC: 155 MG/DL (ref 70–99)
HCT VFR BLD AUTO: 42.2 % (ref 41–53)
HGB BLD-MCNC: 14.1 G/DL (ref 13.5–17.5)
LYMPHOCYTES # BLD: 9 % (ref 24–44)
LYMPHOCYTES NFR BLD: 1.73 K/UL (ref 1–4.8)
MCH RBC QN AUTO: 31.9 PG (ref 26–34)
MCHC RBC AUTO-ENTMCNC: 33.3 G/DL (ref 31–37)
MCV RBC AUTO: 95.7 FL (ref 80–100)
MONOCYTES NFR BLD: 0.58 K/UL (ref 0.1–0.8)
MONOCYTES NFR BLD: 3 % (ref 1–7)
MORPHOLOGY: NORMAL
NEUTROPHILS NFR BLD: 88 % (ref 36–66)
NEUTS SEG NFR BLD: 16.89 K/UL (ref 1.8–7.7)
PH, URINE: 7
PLATELET # BLD AUTO: 232 K/UL (ref 140–450)
PMV BLD AUTO: 8.3 FL (ref 6–12)
POTASSIUM SERPL-SCNC: 4.5 MMOL/L (ref 3.7–5.3)
RBC # BLD AUTO: 4.41 M/UL (ref 4.5–5.9)
SODIUM SERPL-SCNC: 136 MMOL/L (ref 135–144)
WBC OTHER # BLD: 19.2 K/UL (ref 3.5–11)

## 2023-05-19 PROCEDURE — 82947 ASSAY GLUCOSE BLOOD QUANT: CPT

## 2023-05-19 PROCEDURE — 36415 COLL VENOUS BLD VENIPUNCTURE: CPT

## 2023-05-19 PROCEDURE — 85025 COMPLETE CBC W/AUTO DIFF WBC: CPT

## 2023-05-19 PROCEDURE — 6360000002 HC RX W HCPCS: Performed by: STUDENT IN AN ORGANIZED HEALTH CARE EDUCATION/TRAINING PROGRAM

## 2023-05-19 PROCEDURE — 6370000000 HC RX 637 (ALT 250 FOR IP): Performed by: STUDENT IN AN ORGANIZED HEALTH CARE EDUCATION/TRAINING PROGRAM

## 2023-05-19 PROCEDURE — 99238 HOSP IP/OBS DSCHRG MGMT 30/<: CPT | Performed by: STUDENT IN AN ORGANIZED HEALTH CARE EDUCATION/TRAINING PROGRAM

## 2023-05-19 PROCEDURE — 80048 BASIC METABOLIC PNL TOTAL CA: CPT

## 2023-05-19 PROCEDURE — 99231 SBSQ HOSP IP/OBS SF/LOW 25: CPT | Performed by: INTERNAL MEDICINE

## 2023-05-19 RX ORDER — MORPHINE SULFATE 2 MG/ML
2 INJECTION, SOLUTION INTRAMUSCULAR; INTRAVENOUS
Status: DISCONTINUED | OUTPATIENT
Start: 2023-05-19 | End: 2023-05-19 | Stop reason: HOSPADM

## 2023-05-19 RX ORDER — MORPHINE SULFATE 4 MG/ML
4 INJECTION, SOLUTION INTRAMUSCULAR; INTRAVENOUS
Status: DISCONTINUED | OUTPATIENT
Start: 2023-05-19 | End: 2023-05-19 | Stop reason: HOSPADM

## 2023-05-19 RX ORDER — LORAZEPAM 2 MG/ML
1 INJECTION INTRAMUSCULAR EVERY 4 HOURS PRN
Status: DISCONTINUED | OUTPATIENT
Start: 2023-05-19 | End: 2023-05-19 | Stop reason: HOSPADM

## 2023-05-19 RX ADMIN — DEXAMETHASONE SODIUM PHOSPHATE 4 MG: 4 INJECTION, SOLUTION INTRAMUSCULAR; INTRAVENOUS at 00:59

## 2023-05-19 RX ADMIN — APIXABAN 5 MG: 5 TABLET, FILM COATED ORAL at 07:45

## 2023-05-19 RX ADMIN — DEXAMETHASONE SODIUM PHOSPHATE 4 MG: 4 INJECTION, SOLUTION INTRAMUSCULAR; INTRAVENOUS at 06:25

## 2023-05-19 RX ADMIN — HYDROCODONE BITARTRATE AND ACETAMINOPHEN 1 TABLET: 5; 325 TABLET ORAL at 07:45

## 2023-05-19 RX ADMIN — MORPHINE SULFATE 4 MG: 4 INJECTION, SOLUTION INTRAMUSCULAR; INTRAVENOUS at 17:44

## 2023-05-19 RX ADMIN — HYDROCODONE BITARTRATE AND ACETAMINOPHEN 1 TABLET: 5; 325 TABLET ORAL at 13:06

## 2023-05-19 ASSESSMENT — PAIN SCALES - GENERAL
PAINLEVEL_OUTOF10: 9
PAINLEVEL_OUTOF10: 8
PAINLEVEL_OUTOF10: 2
PAINLEVEL_OUTOF10: 5
PAINLEVEL_OUTOF10: 10
PAINLEVEL_OUTOF10: 2

## 2023-05-19 ASSESSMENT — PAIN DESCRIPTION - PAIN TYPE
TYPE: ACUTE PAIN
TYPE: ACUTE PAIN;CHRONIC PAIN

## 2023-05-19 ASSESSMENT — PAIN DESCRIPTION - LOCATION
LOCATION: LEG
LOCATION: BACK
LOCATION: LEG
LOCATION: BACK
LOCATION: BACK

## 2023-05-19 ASSESSMENT — PAIN DESCRIPTION - FREQUENCY
FREQUENCY: CONTINUOUS
FREQUENCY: CONTINUOUS
FREQUENCY: INTERMITTENT

## 2023-05-19 ASSESSMENT — PAIN DESCRIPTION - DESCRIPTORS
DESCRIPTORS: CRUSHING
DESCRIPTORS: CRUSHING
DESCRIPTORS: SHARP

## 2023-05-19 ASSESSMENT — PAIN DESCRIPTION - ONSET: ONSET: OTHER (COMMENT)

## 2023-05-19 ASSESSMENT — PAIN - FUNCTIONAL ASSESSMENT
PAIN_FUNCTIONAL_ASSESSMENT: INTOLERABLE, UNABLE TO DO ANY ACTIVE OR PASSIVE ACTIVITIES
PAIN_FUNCTIONAL_ASSESSMENT: INTOLERABLE, UNABLE TO DO ANY ACTIVE OR PASSIVE ACTIVITIES

## 2023-05-19 ASSESSMENT — PAIN DESCRIPTION - ORIENTATION
ORIENTATION: LEFT
ORIENTATION: LEFT
ORIENTATION: RIGHT;LEFT

## 2023-05-19 ASSESSMENT — PAIN DESCRIPTION - DIRECTION
RADIATING_TOWARDS: BACK
RADIATING_TOWARDS: BACK

## 2023-05-19 NOTE — PLAN OF CARE
Problem: Chronic Conditions and Co-morbidities  Goal: Patient's chronic conditions and co-morbidity symptoms are monitored and maintained or improved  Outcome: Not Progressing     Problem: Safety - Adult  Goal: Free from fall injury  Outcome: Progressing     Problem: Pain  Goal: Verbalizes/displays adequate comfort level or baseline comfort level  Outcome: Progressing     Problem: ABCDS Injury Assessment  Goal: Absence of physical injury  Outcome: Progressing     Problem: Skin/Tissue Integrity  Goal: Absence of new skin breakdown  Description: 1. Monitor for areas of redness and/or skin breakdown  2. Assess vascular access sites hourly  3. Every 4-6 hours minimum:  Change oxygen saturation probe site  4. Every 4-6 hours:  If on nasal continuous positive airway pressure, respiratory therapy assess nares and determine need for appliance change or resting period.   Outcome: Progressing     Problem: Safety - Adult  Goal: Free from fall injury  Outcome: Progressing     Problem: Chronic Conditions and Co-morbidities  Goal: Patient's chronic conditions and co-morbidity symptoms are monitored and maintained or improved  Outcome: Not Progressing

## 2023-05-19 NOTE — FLOWSHEET NOTE
[address]  PROGRESS NOTE    Shift date: 5/19/23  Shift day: Friday   Shift # 1    Room # 828/991-04   Name: Jackie Norman                  Referral: Routine Visit    Admit Date & Time: 5/17/2023  5:09 PM    Assessment:  Jackie Norman is a 74 y.o. male. Upon entering the room writer observes patient lying in bed, awake but not alert. Spouse sitting bedside talking to hospice care provider by phone. Intervention:  Writer introduced self and title as  Writer offered space for patient and family   to express feelings, needs, and concerns and provided a ministry presence. Outcome:  Patient's spouse informed writer that Frandy and other chaplains have been helpful. Thanked  for reaching out. Plan:  Chaplains will remain available to offer spiritual and emotional support as needed.       Electronically signed by Leighton Capps on 5/19/2023 at 11:54 AM.  [facility]  [phone]

## 2023-05-19 NOTE — CARE COORDINATION
CIELO Vargas from [facility] family opted for IP Hospice. Pt to be picked up at [address] per [facility] to [facility].

## 2023-05-19 NOTE — CONSULTS
..  Palliative Care Inpatient Consult    NAME:  Ritesh Mullen  MEDICAL RECORD NUMBER:  [id number]  AGE: 74 y.o. GENDER: male  : 1950  TODAY'S DATE:  2023    Reasons for Consultation:    Provision of information regarding PC and/or hospice philosophies  Complex, time-intensive communication and interdisciplinary psychosocial support  Clarification of goals of care and/or assistance with difficult decision-making  Guidance in regards to resources and transition(s)    Code Status: [facility]    Members of PC team contributing to this consultation are :  Amira Clark RN    PLAN:  -Pt awake and oriented. He denies pain, only occasionally in his legs  -Daily radiation.   -Pt knows his disease is not curable. He wishes to continue radiation in the hopes of \"walking again and going home\". I explained this may not happen.   -Primary service has talked with wife and states she is more realistic about the situation. I could not reach the wife and no way to leave a message  -Will continue to follow and continue goals of care discussion. History of Present Illness     The patient is a 74 y.o. Non- / non  male who presents with No chief complaint on file. Referred to Palliative Care by   [x] Physician   [] Nursing  [] Family Request   [] Other:       He was admitted to the primary service for Cord compression [facility]) [G95.20]  Spinal cord compression ([address] [G95.20]. His hospital course has been associated with Cord compression [facility]).  The patient has a complicated medical history and has been hospitalized since 2023  5:09 PM.    Active Hospital Problems    Diagnosis Date Noted    Cord compression ([address] [G95.20] 2023    Spinal cord compression ([address] [G95.20] 2023    Metastasis to bone ([address] [C79.51] 2023    Malignant neoplasm metastatic to both lungs (HCC) [C78.01, C78.02] 2023    Severe malnutrition ([address] [E43] 2023    Atrial fibrillation with RVR ([address] [I48.91]

## 2023-05-19 NOTE — DISCHARGE INSTR - COC
Continuity of Care Form    Patient Name: Shahbaz Yung   :  1950  MRN:  [id number]    Admit date:  2023  Discharge date:  ***    Code Status Order: DNR-CC   Advance Directives:     Admitting Physician:  Chaitanya Purdy MD  PCP: Rafy Caraballo MD    Discharging Nurse: [facility] Unit/Room#: 772/156-55  Discharging Unit Phone Number: ***    Emergency Contact:   Extended Emergency Contact Information  Primary Emergency Contact: [address] Phone: [phone]  Relation: Spouse  Preferred language: English   needed?  No  Secondary Emergency Contact: Moe Parker Phone: [phone]  Relation: Child    Past Surgical History:  Past Surgical History:   Procedure Laterality Date    APPENDECTOMY      CARDIAC CATHETERIZATION      CARDIOVERSION      COLONOSCOPY      CT BIOPSY PERCUTANEOUS DEEP BONE  5/15/2023    CT BIOPSY PERCUTANEOUS DEEP BONE 5/15/2023 [facility] CT SCAN       Immunization History:   Immunization History   Administered Date(s) Administered    COVID-19, PFIZER Bivalent, DO NOT Dilute, (age 12y+), IM, 27 mcg/0.3 mL 2022       Active Problems:  Patient Active Problem List   Diagnosis Code    Atrial fibrillation with RVR ([facility]) I48.91    Anxiety F41.9    Cardiomyopathy ([address] I42.9    Chronic obstructive pulmonary disease (HCC) J44.9    Coronary artery disease involving native coronary artery of native heart without angina pectoris I25.10    Essential hypertension I10    Hyperlipidemia E78.5    Palpitations R00.2    Severe malnutrition (HCC) E43    Elevated troponin level not due myocardial infarction R77.8    Left foot drop M21.372    Acute left-sided low back pain with left-sided sciatica M54.42    Abnormal magnetic resonance imaging of lumbar spine R93.7    Metastasis to bone (HCC) C79.51    Malignant neoplasm metastatic to both lungs (HCC) C78.01, C78.02    Compression of lumbar nerve root M54.16    Weakness of both lower extremities R29.898    Compression of

## 2023-05-19 NOTE — TELEPHONE ENCOUNTER
Name: Jackie Norman  : 1950  MRN: [id number]    Oncology Navigation- Initial Note:  Navigator reviewing chart and pt.  Going to [facility] today  Electronically signed by Guillermo Gonzalez RN on 2023 at 2:21 PM

## 2023-05-19 NOTE — PROGRESS NOTES
Prognosis and goals of care were discussed with the patient and his spouse at bedside. Family has decided to stop aggressive care and pursue Hospice services at this time.  Code status has been changed to [facility] and Hospice has been consulted per patient/family request.

## 2023-05-19 NOTE — PROGRESS NOTES
Sindy arrived to transport pt to [facility]. Report & paperwork given. Granddaughters at bedside. Pt. Reported pain 2/10. No further medication administered. Pt. Transferred to stretcher without incident. 2 Granddaughters took all pt belongings with them. Pt left floor with Sindy @ 7238.      @ 5029 writer called report to [facility] [phone], Patricia Maloney RN to take pt. Constantin Brody took brief updates & [facility] inpatient phone number provided in case Mario Loco has any questions.

## 2023-05-19 NOTE — PROGRESS NOTES
Today's Date: 5/19/2023  Patient Name: Yulissa Summers  Date of admission: 5/17/2023  5:09 PM  Patient's age: 74 y. o., 1950  Admission Dx: Cord compression ([address]) [G95.20]  Spinal cord compression ([address]) [G95.20]    Reason for Consult: management recommendations  Requesting Physician: Grace Reyes MD    CHIEF COMPLAINT: Metastatic cancer. Lower extremity weakness. Interim history  The patient is seen and evaluated. Lethargic and unresponsive. Patient quite decided to forego radiation and go to palliative and hospice care. We had a discussion about his condition and treatment options. It seems that she is well-informed and decided on her palliative care/hospice evaluation request  HISTORY OF PRESENT ILLNESS:      The patient is a 74 y.o.  male who is Transferred from [facility].  Patient was initially admitted for evaluation after a fall. Work-up revealed atrial fibrillation with RVR. Also noted to have bilateral lower extremity weakness underwent further work-up including MRI. MRI shows bony metastasis with paraspinal soft tissue extension. MRI of cervical spine thoracic spine also showed diffuse metastasis. CT chest abdomen pelvis showed spiculated right apical mass concerning for neoplasm initially there were innumerable scattered pulmonary nodules as well as osteolytic process involving sacrum bilateral sacral dennis and nondisplaced fractures of transverse process. MRI brain showed tiny acute ischemic infarcts along with right cerebral and left frontal parietal occipital territories. Lower extremity weakness thought to be secondary to radiculopathy from tumor extension neurosurgery was consulted and they recommended radiation. Patient is subsequently transferred to [facility] for radiation. Patient underwent biopsy of the sacrum which was metastatic adenocarcinoma poorly differentiated. Based on IHC and clinical imaging lung is likely primary.   Patient has a change of the left SI joint. There is moderate to severe degenerative change of the left hip joint. The surrounding soft tissues are unremarkable. No acute osseous or soft tissue abnormality. Degenerative change of the left SI joint and left hip joint. CT HEAD WO CONTRAST    Result Date: 5/13/2023  EXAMINATION: CT OF THE HEAD WITHOUT CONTRAST  5/13/2023 1:11 pm TECHNIQUE: CT of the head was performed without the administration of intravenous contrast. Automated exposure control, iterative reconstruction, and/or weight based adjustment of the mA/kV was utilized to reduce the radiation dose to as low as reasonably achievable. COMPARISON: None. HISTORY: ORDERING SYSTEM PROVIDED HISTORY: look for metastatic disease TECHNOLOGIST PROVIDED HISTORY: look for metastatic disease Reason for Exam: look for metastatic disease FINDINGS: BRAIN/VENTRICLES: No evidence of acute intracranial hemorrhage, mass effect or midline shift. Hypoattenuating periventricular nonspecific white matter disease. No evidence of hydrocephalus. No appreciable vasogenic edema. ORBITS: The visualized portion of the orbits demonstrate no acute abnormality. SINUSES: The visualized paranasal sinuses and mastoid air cells demonstrate no acute abnormality. SOFT TISSUES/SKULL:  No acute abnormality of the visualized skull or soft tissues. No acute intracranial abnormality. No appreciable vasogenic edema to suggest metastatic disease. Correlate with follow-up outpatient MRI with and without IV contrast for more accurate assessment of metastatic disease. MRI LUMBAR SPINE WO CONTRAST    Result Date: 5/12/2023  EXAMINATION: MRI OF THE LUMBAR SPINE WITHOUT CONTRAST, 5/12/2023 4:14 pm TECHNIQUE: Multiplanar multisequence MRI of the lumbar spine was performed without the administration of intravenous contrast. COMPARISON: None.  HISTORY: ORDERING SYSTEM PROVIDED HISTORY: lower extremity weakness, foot drop after fall TECHNOLOGIST PROVIDED an infectious/inflammatory process. 3. Mediastinal lymphadenopathy, nonspecific and possibly metastatic. 4. Bilateral interstitial edema and small bilateral pleural effusions. CT ABDOMEN AND PELVIS: 1. Osteolytic process involving the sacrum concerning for metastasis. Subtle bilateral sacral ala fractures, likely pathologic. Presacral stranding is likely reactive. 2. Nondisplaced fractures of the transverse processes of L5. Recommend correlation with trauma history. 3. No acute process within the abdomen or pelvis identified. 4. Heterogenous material within the gallbladder favors cholelithiasis. Consider further evaluation with ultrasound. 5. Multiple hypodensities within the kidneys, some which are too small to adequately characterize by CT. Consider further evaluation with dedicated MRI. MRI BRAIN W WO CONTRAST    Result Date: 5/15/2023  EXAMINATION: MRI OF THE BRAIN WITHOUT AND WITH CONTRAST  5/15/2023 9:06 am TECHNIQUE: Multiplanar multisequence MRI of the head/brain was performed without and with the administration of intravenous contrast. COMPARISON: None. HISTORY: ORDERING SYSTEM PROVIDED HISTORY: possible brain mets TECHNOLOGIST PROVIDED HISTORY: possible brain mets Reason for Exam: Gavin Cobos on 5/15/2023 10:52 AM EDT FINDINGS: INTRACRANIAL STRUCTURES/VENTRICLES:  Tiny foci of diffusion restriction and T2/FLAIR hyperintensity involving the lateral right cerebellum, left occipital lobe, and lateral left frontal and parietal lobes. Senescent parenchymal volume loss with moderate chronic white matter microvascular ischemic changes. No intracranial mass. No mass effect or midline shift. No evidence of an acute intracranial hemorrhage. The ventricles and sulci are normal in size and configuration. The sellar/suprasellar regions appear unremarkable. The normal signal voids within the major intracranial vessels appear maintained. No abnormal focus of enhancement is seen within the brain.  ORBITS:

## 2023-05-19 NOTE — DISCHARGE SUMMARY
[facility]  Office: [address], DO, Zaireon [place], DO, Sayda Beets, DO, Todd Callaway Blood, DO, Demetria Grimaldo MD, Monica rCain MD, Johnnie Claude, MD, Connie Huang MD,  Clara Hall MD, Truett Burkitt, MD, Amberly Simmons, DO, Jackie Sarmiento MD,  Rafi Schafer MD, Owen Holcomb MD, Jane Coppola, DO, Gricelda Marshall MD, Miranda Rosenbaum MD, Billie Arriola, DO, Tameka Whitlock MD, Julienne Coles MD, Denisse Coronel MD, Ange Amin MD,  Tacos Sevilla, DO, Lulu Zepeda MD,  Matthew Cabrales, CNP,  Anirudh Thompson, CNP, Eliza Allen, CNP, Thee Douglas, CNP,  Roland Griggs, DNP, Marylen Net, CNP, Fabiano Ryan, CNP, Mariaelena Leyva, CNP, Vanessa Morelos, CNP, Paul Moon, CNP, Maricruz Daniel PA-C, Sandra Valentine, CNS, Carl Serrano, CNP, Debi Gonzalez, CNP         [facility]    Discharge Summary     Patient ID: Daniel Killian  :  1950   MRN: [id number]     ACCOUNT:  [de-identified]   Patient's PCP: Waldo Frias MD  Admit Date: 2023   Discharge Date: 2023  Length of Stay: 2  Code Status:  DNR-CC  Admitting Physician: Owen Holcomb MD  Discharge Physician: Owen Holcomb MD     Active Discharge Diagnoses:     Hospital Problem Lists:  Principal Problem:    Cord compression [facility])  Active Problems:    Atrial fibrillation with RVR ([facility] [address]    Cardiomyopathy ([facility] [address]    Chronic obstructive pulmonary disease ([facility] [address]    Essential hypertension    Severe malnutrition ([facility] [address]    Metastasis to bone ([facility] [address]    Malignant neoplasm metastatic to both lungs [facility])    Spinal cord compression ([facility] [address]  Resolved Problems:    * No resolved hospital problems. *      Admission Condition:  poor     Discharged Condition: poor    Hospital Stay:     Hospital Course:  Daniel Killian is a 74 y.o.  Non- / non  male with a past medical history of recently diagnosed Stage IV cancer of unknown primary (likely lung), atrial

## 2023-05-25 LAB
SPECIMEN TYPE: NORMAL
SPECIMEN VOL UR: NORMAL ML
URINE IFX INTERP: NORMAL
URINE TOTAL PROTEIN: 12 MG/DL